# Patient Record
Sex: MALE | URBAN - METROPOLITAN AREA
[De-identification: names, ages, dates, MRNs, and addresses within clinical notes are randomized per-mention and may not be internally consistent; named-entity substitution may affect disease eponyms.]

---

## 2022-06-10 ENCOUNTER — TELEPHONE (OUTPATIENT)
Dept: UROLOGY | Facility: CLINIC | Age: 67
End: 2022-06-10

## 2022-06-10 ENCOUNTER — TRANSCRIBE ORDERS (OUTPATIENT)
Dept: OTHER | Age: 67
End: 2022-06-10

## 2022-06-10 DIAGNOSIS — N28.89 URETERIC FISTULA: Primary | ICD-10-CM

## 2022-06-10 NOTE — TELEPHONE ENCOUNTER
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

## 2022-06-14 NOTE — TELEPHONE ENCOUNTER
Spoke with patient and he will call back to schedule with Morgan or Michele for ureterocolonic fistula

## 2023-08-07 ENCOUNTER — TRANSITIONAL CARE UNIT VISIT (OUTPATIENT)
Dept: GERIATRICS | Facility: CLINIC | Age: 68
End: 2023-08-07
Payer: COMMERCIAL

## 2023-08-07 VITALS
RESPIRATION RATE: 20 BRPM | SYSTOLIC BLOOD PRESSURE: 113 MMHG | DIASTOLIC BLOOD PRESSURE: 67 MMHG | TEMPERATURE: 98.2 F | WEIGHT: 133.1 LBS | OXYGEN SATURATION: 96 % | BODY MASS INDEX: 19.71 KG/M2 | HEIGHT: 69 IN | HEART RATE: 69 BPM

## 2023-08-07 DIAGNOSIS — R53.81 PHYSICAL DECONDITIONING: Primary | ICD-10-CM

## 2023-08-07 DIAGNOSIS — R39.0: ICD-10-CM

## 2023-08-07 DIAGNOSIS — I10 BENIGN ESSENTIAL HYPERTENSION: ICD-10-CM

## 2023-08-07 DIAGNOSIS — R52 PAIN: Primary | ICD-10-CM

## 2023-08-07 DIAGNOSIS — N18.31 STAGE 3A CHRONIC KIDNEY DISEASE (H): ICD-10-CM

## 2023-08-07 DIAGNOSIS — I50.32 CHRONIC HEART FAILURE WITH PRESERVED EJECTION FRACTION (H): ICD-10-CM

## 2023-08-07 DIAGNOSIS — Z48.815 ENCOUNTER FOR SURGICAL AFTERCARE FOLLOWING SURGERY OF DIGESTIVE SYSTEM: ICD-10-CM

## 2023-08-07 DIAGNOSIS — I47.19 ATRIAL TACHYCARDIA (H): ICD-10-CM

## 2023-08-07 PROCEDURE — 99310 SBSQ NF CARE HIGH MDM 45: CPT | Performed by: NURSE PRACTITIONER

## 2023-08-07 RX ORDER — PREDNISONE 10 MG/1
30 TABLET ORAL DAILY
COMMUNITY
Start: 2023-08-05 | End: 2023-08-13

## 2023-08-07 RX ORDER — LEVOTHYROXINE SODIUM 88 UG/1
88 TABLET ORAL DAILY
COMMUNITY
Start: 2023-04-30

## 2023-08-07 RX ORDER — MULTIVITAMIN
1 TABLET ORAL DAILY
COMMUNITY

## 2023-08-07 RX ORDER — ALBUTEROL SULFATE 0.83 MG/ML
2.5 SOLUTION RESPIRATORY (INHALATION) EVERY 4 HOURS PRN
COMMUNITY
Start: 2023-08-04

## 2023-08-07 RX ORDER — ATORVASTATIN CALCIUM 20 MG/1
1 TABLET, FILM COATED ORAL DAILY
COMMUNITY
Start: 2023-05-02

## 2023-08-07 RX ORDER — TRAZODONE HYDROCHLORIDE 100 MG/1
1 TABLET ORAL AT BEDTIME
COMMUNITY

## 2023-08-07 RX ORDER — SPIRONOLACTONE 25 MG/1
1 TABLET ORAL EVERY MORNING
COMMUNITY
Start: 2023-08-05 | End: 2023-08-23

## 2023-08-07 RX ORDER — ACETAMINOPHEN 325 MG/1
650 TABLET ORAL EVERY 4 HOURS PRN
COMMUNITY
Start: 2022-07-12

## 2023-08-07 RX ORDER — METOPROLOL SUCCINATE 50 MG/1
50 TABLET, EXTENDED RELEASE ORAL DAILY
COMMUNITY
Start: 2023-05-01

## 2023-08-07 RX ORDER — FUROSEMIDE 40 MG
1 TABLET ORAL EVERY MORNING
COMMUNITY
Start: 2023-08-05 | End: 2023-09-01

## 2023-08-07 RX ORDER — OXYBUTYNIN CHLORIDE 10 MG/1
1 TABLET, EXTENDED RELEASE ORAL DAILY
COMMUNITY
Start: 2023-06-14 | End: 2023-08-23

## 2023-08-07 RX ORDER — FERROUS SULFATE 325(65) MG
325 TABLET ORAL DAILY
COMMUNITY
Start: 2023-08-05

## 2023-08-07 RX ORDER — OXYCODONE HYDROCHLORIDE 5 MG/1
5 TABLET ORAL EVERY 6 HOURS PRN
Qty: 60 TABLET | Refills: 0 | Status: SHIPPED | OUTPATIENT
Start: 2023-08-07 | End: 2023-08-10

## 2023-08-07 NOTE — LETTER
8/7/2023        RE: Houston Willis  [address] [place] [address]        [facility]    PRIMARY CARE PROVIDER AND CLINIC:  Parag Mann MD  Chief Complaint   Patient presents with     Hospital F/U      [place] Medical Record Number:  [id number]  Place of Service where encounter took place:  [facility] ([facility]) [[id number]]    Houston Willis  is a 68 year old  (1955), admitted to the above facility from  [facility]. Hospital stay 7/19/23 through 8/5/23. 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 Post-op course complicated by respiratory failure, fluid overload, abdominal wall wound dehiscence requiring takeback to OR, and a left ureteral stump urine leak being managed by Mcknight catheter.     HPI obtained from patient visit, review of nursing home record, discussion with facility staff, and Epic review.     HPI:    Patient just returned from doing therapy. He says this went well. As we are talking he starts opening his colostomy (laying in bed, no gloves on), provider asks why he is doing then, he says to expel the gas. He ends up getting stool on his fingers. He recognizes that this is not ideal, but just wipes them with a tissue. He also says that he believes that the ostomy should remain free of stool, so he has had nursing flush the bag with saline. Provider advises him that this is not necessary. For now he should use his call light for any colostomy needs and nursing will be teaching him how to manage it properly. He asks about having the Mcknight catheter removed, says he can just use a urinal. Provider educates him about the reason for the catheter.     CODE STATUS/ADVANCE DIRECTIVES DISCUSSION:  Full Code    ALLERGIES:   Allergies   Allergen Reactions     Amiodarone Other (See  Comments)     Bradycardia, possible lung toxicity(work up pending)      PAST MEDICAL HISTORY: No past medical history on file.   PAST SURGICAL HISTORY:   has no past surgical history on file.  FAMILY HISTORY: family history is not on file.  SOCIAL HISTORY:     Patient's living condition: lives alone    Post Discharge Medication Reconciliation Status:   MED REC REQUIRED  Post Medication Reconciliation Status:  Discharge medications reconciled, continue medications without change       Current Outpatient Medications   Medication Sig     acetaminophen (TYLENOL) 325 MG tablet Take 650 mg by mouth every 4 hours as needed     albuterol (PROVENTIL) (2.5 MG/3ML) 0.083% neb solution Take 2.5 mg by nebulization every 4 hours as needed     apixaban ANTICOAGULANT (ELIQUIS ANTICOAGULANT) 5 MG tablet Take 1 tablet by mouth 2 times daily     atorvastatin (LIPITOR) 20 MG tablet Take 1 tablet by mouth daily     ferrous sulfate (FEROSUL) 325 (65 Fe) MG tablet Take 325 mg by mouth daily     furosemide (LASIX) 40 MG tablet Take 1 tablet by mouth every morning     levothyroxine (SYNTHROID/LEVOTHROID) 88 MCG tablet Take 88 mcg by mouth daily     metoprolol succinate ER (TOPROL XL) 50 MG 24 hr tablet Take 50 mg by mouth daily     Multiple Vitamin (ONE-A-DAY ESSENTIAL) TABS Take 1 tablet by mouth daily     omeprazole (PRILOSEC) 20 MG DR capsule Take 20 mg by mouth daily     oxybutynin ER (DITROPAN XL) 10 MG 24 hr tablet Take 1 tablet by mouth daily     oxyCODONE (ROXICODONE) 5 MG tablet Take 1 tablet (5 mg) by mouth every 6 hours as needed for pain     predniSONE (DELTASONE) 10 MG tablet Take 30 mg by mouth daily     spironolactone (ALDACTONE) 25 MG tablet Take 1 tablet by mouth every morning     traZODone (DESYREL) 100 MG tablet Take 1 tablet by mouth At Bedtime     No current facility-administered medications for this visit.       ROS:  10 point ROS of systems including Constitutional, Eyes, Respiratory, Cardiovascular, Gastroenterology,  "Genitourinary, Integumentary, Musculoskeletal, Psychiatric were all negative except for pertinent positives noted in my HPI.    Vitals:  /67   Pulse 69   Temp 98.2  F (36.8  C)   Resp 20   Ht 1.753 m (5' 9\")   Wt 60.4 kg (133 lb 1.6 oz)   SpO2 96%   BMI 19.66 kg/m    Exam:  GENERAL APPEARANCE:  Alert, in no distress  ENT:  Mouth and posterior oropharynx normal, moist mucous membranes  EYES:  EOM normal, conjunctiva and lids normal  RESP:  no respiratory distress  CV:  no edema  ABDOMEN:  soft, no distension, colostomy round, red, moist, stool is brown and soft, incision healing well with staples  PSYCH:  oriented X 3, insight and judgement impaired    Lab/Diagnostic data:  Recent labs in [facility] reviewed by me today.       ASSESSMENT/PLAN:  (R53.81) Physical deconditioning  (primary encounter diagnosis)  Comment: Due to surgery and post-op complications  Plan: PT/OT eval and treat, discharge planning per their recommendations.    (Z48.815) Encounter for surgical aftercare following surgery of digestive system  Comment: Wound healing well. Ostomy functioning appropriately. Patient requires teaching, but this can be accomplished while at TCU.   Plan: Continue current POC with no changes at this time and adjustments as needed.    (R39.0) Ureteral leak, left  Comment: Patient could potentially go home with catheter as well. Will focus on ostomy teaching for now. If discharge is planned and catheter remains in place, recommend teaching at that time  Plan: Continue current POC with no changes at this time and adjustments as needed.    (I47.1) Atrial tachycardia (H)  Comment: Rate controlled. On DOAC for anticoagulation with no s/sx side effects  Plan: Continue current POC with no changes at this time and adjustments as needed.    (I50.32) Chronic heart failure with preserved ejection fraction (H)  Comment: Currently compensated. He is on double his home dose of lasix and aldactone is new. Will need to monitor " BMP and adjust as needed.  Plan: Continue current medications: lasix and aldactone. Monitor labs weekly. Monitor weights q day. Call provider if greater than 3 pound gain from previous weight. Monitor for shortness of breath, wheezing, increasing lower extremity edema and change in activity tolerance.   Monitor for shortness of breath, wheezing, increasing lower extremity edema and change in activity tolerance.     (N18.31) Stage 3a chronic kidney disease (H)  Comment: Chronic Kidney Disease due to: Hypertension.  Plan: Avoid nephrotoxic medications and adjust medications per renal function.     (I10) Benign essential hypertension  Comment: Chronic, controlled  Plan: Continue current POC with no changes at this time and adjustments as needed.        Total time spent with patient visit at the skilled nursing facility was 45 min including patient visit and review of past records.     Electronically signed by:  CODY Constantino CNP                     Sincerely,        CODY Constantino CNP

## 2023-08-08 PROBLEM — N18.30 STAGE 3 CHRONIC KIDNEY DISEASE (H): Status: ACTIVE | Noted: 2022-03-30

## 2023-08-08 PROBLEM — I50.32 CHRONIC HEART FAILURE WITH PRESERVED EJECTION FRACTION (H): Status: ACTIVE | Noted: 2023-08-08

## 2023-08-08 PROBLEM — I47.19 ATRIAL TACHYCARDIA (H): Status: ACTIVE | Noted: 2023-07-04

## 2023-08-08 PROBLEM — N32.1 VESICOCOLIC FISTULA: Status: ACTIVE | Noted: 2022-01-18

## 2023-08-08 PROBLEM — I10 BENIGN ESSENTIAL HYPERTENSION: Status: ACTIVE | Noted: 2023-08-08

## 2023-08-08 NOTE — PROGRESS NOTES
[facility]    PRIMARY CARE PROVIDER AND CLINIC:  Parag Mann MD  Chief Complaint   Patient presents with    Hospital F/U      [place] Medical Record Number:  [id number]  Place of Service where encounter took place:  [facility] ([facility]) [[id number]]    Houston Willis  is a 68 year old  (1955), admitted to the above facility from  [facility]. Hospital stay 7/19/23 through 8/5/23. 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 Post-op course complicated by respiratory failure, fluid overload, abdominal wall wound dehiscence requiring takeback to OR, and a left ureteral stump urine leak being managed by Mcknight catheter.     HPI obtained from patient visit, review of nursing home record, discussion with facility staff, and Epic review.     HPI:    Patient just returned from doing therapy. He says this went well. As we are talking he starts opening his colostomy (laying in bed, no gloves on), provider asks why he is doing then, he says to expel the gas. He ends up getting stool on his fingers. He recognizes that this is not ideal, but just wipes them with a tissue. He also says that he believes that the ostomy should remain free of stool, so he has had nursing flush the bag with saline. Provider advises him that this is not necessary. For now he should use his call light for any colostomy needs and nursing will be teaching him how to manage it properly. He asks about having the Mcknight catheter removed, says he can just use a urinal. Provider educates him about the reason for the catheter.     CODE STATUS/ADVANCE DIRECTIVES DISCUSSION:  Full Code    ALLERGIES:   Allergies   Allergen Reactions    Amiodarone Other (See Comments)     Bradycardia, possible lung toxicity(work up pending)      PAST MEDICAL  HISTORY: No past medical history on file.   PAST SURGICAL HISTORY:   has no past surgical history on file.  FAMILY HISTORY: family history is not on file.  SOCIAL HISTORY:     Patient's living condition: lives alone    Post Discharge Medication Reconciliation Status:   MED REC REQUIRED  Post Medication Reconciliation Status:  Discharge medications reconciled, continue medications without change       Current Outpatient Medications   Medication Sig    acetaminophen (TYLENOL) 325 MG tablet Take 650 mg by mouth every 4 hours as needed    albuterol (PROVENTIL) (2.5 MG/3ML) 0.083% neb solution Take 2.5 mg by nebulization every 4 hours as needed    apixaban ANTICOAGULANT (ELIQUIS ANTICOAGULANT) 5 MG tablet Take 1 tablet by mouth 2 times daily    atorvastatin (LIPITOR) 20 MG tablet Take 1 tablet by mouth daily    ferrous sulfate (FEROSUL) 325 (65 Fe) MG tablet Take 325 mg by mouth daily    furosemide (LASIX) 40 MG tablet Take 1 tablet by mouth every morning    levothyroxine (SYNTHROID/LEVOTHROID) 88 MCG tablet Take 88 mcg by mouth daily    metoprolol succinate ER (TOPROL XL) 50 MG 24 hr tablet Take 50 mg by mouth daily    Multiple Vitamin (ONE-A-DAY ESSENTIAL) TABS Take 1 tablet by mouth daily    omeprazole (PRILOSEC) 20 MG DR capsule Take 20 mg by mouth daily    oxybutynin ER (DITROPAN XL) 10 MG 24 hr tablet Take 1 tablet by mouth daily    oxyCODONE (ROXICODONE) 5 MG tablet Take 1 tablet (5 mg) by mouth every 6 hours as needed for pain    predniSONE (DELTASONE) 10 MG tablet Take 30 mg by mouth daily    spironolactone (ALDACTONE) 25 MG tablet Take 1 tablet by mouth every morning    traZODone (DESYREL) 100 MG tablet Take 1 tablet by mouth At Bedtime     No current facility-administered medications for this visit.       ROS:  10 point ROS of systems including Constitutional, Eyes, Respiratory, Cardiovascular, Gastroenterology, Genitourinary, Integumentary, Musculoskeletal, Psychiatric were all negative except for pertinent  "positives noted in my HPI.    Vitals:  /67   Pulse 69   Temp 98.2  F (36.8  C)   Resp 20   Ht 1.753 m (5' 9\")   Wt 60.4 kg (133 lb 1.6 oz)   SpO2 96%   BMI 19.66 kg/m    Exam:  GENERAL APPEARANCE:  Alert, in no distress  ENT:  Mouth and posterior oropharynx normal, moist mucous membranes  EYES:  EOM normal, conjunctiva and lids normal  RESP:  no respiratory distress  CV:  no edema  ABDOMEN:  soft, no distension, colostomy round, red, moist, stool is brown and soft, incision healing well with staples  PSYCH:  oriented X 3, insight and judgement impaired    Lab/Diagnostic data:  Recent labs in [facility] reviewed by me today.       ASSESSMENT/PLAN:  (R53.81) Physical deconditioning  (primary encounter diagnosis)  Comment: Due to surgery and post-op complications  Plan: PT/OT eval and treat, discharge planning per their recommendations.    (Z48.815) Encounter for surgical aftercare following surgery of digestive system  Comment: Wound healing well. Ostomy functioning appropriately. Patient requires teaching, but this can be accomplished while at TCU.   Plan: Continue current POC with no changes at this time and adjustments as needed.    (R39.0) Ureteral leak, left  Comment: Patient could potentially go home with catheter as well. Will focus on ostomy teaching for now. If discharge is planned and catheter remains in place, recommend teaching at that time  Plan: Continue current POC with no changes at this time and adjustments as needed.    (I47.1) Atrial tachycardia (H)  Comment: Rate controlled. On DOAC for anticoagulation with no s/sx side effects  Plan: Continue current POC with no changes at this time and adjustments as needed.    (I50.32) Chronic heart failure with preserved ejection fraction (H)  Comment: Currently compensated. He is on double his home dose of lasix and aldactone is new. Will need to monitor BMP and adjust as needed.  Plan: Continue current medications: lasix and aldactone. Monitor labs " weekly. Monitor weights q day. Call provider if greater than 3 pound gain from previous weight. Monitor for shortness of breath, wheezing, increasing lower extremity edema and change in activity tolerance.   Monitor for shortness of breath, wheezing, increasing lower extremity edema and change in activity tolerance.     (N18.31) Stage 3a chronic kidney disease (H)  Comment: Chronic Kidney Disease due to: Hypertension.  Plan: Avoid nephrotoxic medications and adjust medications per renal function.     (I10) Benign essential hypertension  Comment: Chronic, controlled  Plan: Continue current POC with no changes at this time and adjustments as needed.        Total time spent with patient visit at the skilled nursing facility was 45 min including patient visit and review of past records.     Electronically signed by:  CODY Constantino CNP

## 2023-08-09 ENCOUNTER — TRANSITIONAL CARE UNIT VISIT (OUTPATIENT)
Dept: GERIATRICS | Facility: CLINIC | Age: 68
End: 2023-08-09
Payer: COMMERCIAL

## 2023-08-09 VITALS
TEMPERATURE: 97 F | WEIGHT: 124.6 LBS | SYSTOLIC BLOOD PRESSURE: 130 MMHG | HEART RATE: 77 BPM | OXYGEN SATURATION: 93 % | BODY MASS INDEX: 18.46 KG/M2 | HEIGHT: 69 IN | DIASTOLIC BLOOD PRESSURE: 70 MMHG | RESPIRATION RATE: 16 BRPM

## 2023-08-09 DIAGNOSIS — N18.31 STAGE 3A CHRONIC KIDNEY DISEASE (H): ICD-10-CM

## 2023-08-09 DIAGNOSIS — R53.81 PHYSICAL DECONDITIONING: Primary | ICD-10-CM

## 2023-08-09 DIAGNOSIS — I47.19 ATRIAL TACHYCARDIA (H): ICD-10-CM

## 2023-08-09 DIAGNOSIS — I10 BENIGN ESSENTIAL HYPERTENSION: ICD-10-CM

## 2023-08-09 DIAGNOSIS — Z48.815 ENCOUNTER FOR SURGICAL AFTERCARE FOLLOWING SURGERY OF DIGESTIVE SYSTEM: ICD-10-CM

## 2023-08-09 DIAGNOSIS — R39.0: ICD-10-CM

## 2023-08-09 DIAGNOSIS — I50.32 CHRONIC HEART FAILURE WITH PRESERVED EJECTION FRACTION (H): ICD-10-CM

## 2023-08-09 PROCEDURE — 99309 SBSQ NF CARE MODERATE MDM 30: CPT | Performed by: NURSE PRACTITIONER

## 2023-08-09 NOTE — PROGRESS NOTES
"[facility]ERIATR[facility]    Chief Complaint   Patient presents with    RECHECK     HPI:  Houston Willis is a 68 year old  (1955), who is being seen today for an episodic care visit at: [facility] ([facility]) [[id number]]. [facility] stay 7/19/23 through 8/5/23. 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 Post-op course complicated by respiratory failure, fluid overload, abdominal wall wound dehiscence requiring takeback to OR, and a left ureteral stump urine leak being managed by Mcknight catheter.     Today's concern is:   Patient reports ongoing improvement with therapy. He understands that he needs to keep the catheter in until he sees urology, but he is under the impression that they will be calling any day to give the order. Provider advises him that he will need to go in. He says he does not want the oxycodone at all. He has not had any alcohol for over 2 months and hopes to avoid anything addictive. Staff have noted that he can be impulsive. SLUMS 18/30. He mod assist for transfers, but walking 200 feet.   -120s/60-70s  HR 60-80s    Allergies, and PMH/PSH reviewed in EPIC today.  REVIEW OF SYSTEMS:  10 point ROS of systems including Constitutional, Eyes, Respiratory, Cardiovascular, Gastroenterology, Genitourinary, Integumentary, Musculoskeletal, Psychiatric were all negative except for pertinent positives noted in my HPI.    Objective:   /70   Pulse 77   Temp 97  F (36.1  C)   Resp 16   Ht 1.753 m (5' 9\")   Wt 56.5 kg (124 lb 9.6 oz)   SpO2 93%   BMI 18.40 kg/m    GENERAL APPEARANCE:  Alert, in no distress  ENT:  Mouth and posterior oropharynx normal, moist mucous membranes  EYES:  EOM normal, conjunctiva and lids normal  RESP:  no respiratory distress  CV:  no edema  ABDOMEN:  no distension, ostomy " red, moist, round  :    catheter in place, urine is clear, nash  PSYCH:  oriented X 3, insight and judgement impaired, affect and mood normal    Recent labs in EPIC reviewed by me today.     Assessment/Plan:  (R53.81) Physical deconditioning  (primary encounter diagnosis)  Comment: Improving  Plan: PT/OT eval and treat, discharge planning per their recommendations.    (Z48.815) Encounter for surgical aftercare following surgery of digestive system  Comment: Patient will need quite a bit of education regarding ostomy cares. He likely will need support at home as well. It is not clear yet how that will be accomplished. Much is dependent on his ability to learn the cares. His abdomen is healing well, ostomy functioning appropriately  Plan: Continue current POC with no changes at this time and adjustments as needed.    (R39.0) Ureteral leak, left  Comment: Patient could potentially go home with catheter as well. Will focus on ostomy teaching for now. If discharge is planned and catheter remains in place, recommend teaching at that time  Plan: Continue current POC with no changes at this time and adjustments as needed.    (I47.1) Atrial tachycardia (H)  Comment: Rate controlled. On DOAC for anticoagulation with no s/sx side effects  Plan: Continue current POC with no changes at this time and adjustments as needed.    (I50.32) Chronic heart failure with preserved ejection fraction (H)  Comment: Currently compensated. He is on double his home dose of lasix and aldactone is new. Will need to monitor BMP and adjust as needed.  Plan: Continue current medications: lasix and aldactone. Monitor labs weekly. Monitor weights q day. Call provider if greater than 3 pound gain from previous weight. Monitor for shortness of breath, wheezing, increasing lower extremity edema and change in activity tolerance.     (N18.31) Stage 3a chronic kidney disease (H)  Comment: Chronic Kidney Disease due to: Hypertension.  Plan: Avoid  nephrotoxic medications and adjust medications per renal function.     (I10) Benign essential hypertension  Comment: Chronic, controlled  Plan: Continue current POC with no changes at this time and adjustments as needed.      Orders:  Discontinue oxycodone  BMP 8/14/23    Electronically signed by: CODY Constantino CNP

## 2023-08-09 NOTE — LETTER
"    8/9/2023        RE: Houston Willis  [address] [place] [address]        [facility] GERIATR[facility]    Chief Complaint   Patient presents with     RECHECK     HPI:  Houston Willis is a 68 year old  (1955), who is being seen today for an episodic care visit at: [facility] ([facility]) [[id number]]. [facility] stay 7/19/23 through 8/5/23. 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 Post-op course complicated by respiratory failure, fluid overload, abdominal wall wound dehiscence requiring takeback to OR, and a left ureteral stump urine leak being managed by Mcknight catheter.     Today's concern is:   Patient reports ongoing improvement with therapy. He understands that he needs to keep the catheter in until he sees urology, but he is under the impression that they will be calling any day to give the order. Provider advises him that he will need to go in. He says he does not want the oxycodone at all. He has not had any alcohol for over 2 months and hopes to avoid anything addictive. Staff have noted that he can be impulsive. SLUMS 18/30. He mod assist for transfers, but walking 200 feet.   -120s/60-70s  HR 60-80s    Allergies, and PMH/PSH reviewed in EPIC today.  REVIEW OF SYSTEMS:  10 point ROS of systems including Constitutional, Eyes, Respiratory, Cardiovascular, Gastroenterology, Genitourinary, Integumentary, Musculoskeletal, Psychiatric were all negative except for pertinent positives noted in my HPI.    Objective:   /70   Pulse 77   Temp 97  F (36.1  C)   Resp 16   Ht 1.753 m (5' 9\")   Wt 56.5 kg (124 lb 9.6 oz)   SpO2 93%   BMI 18.40 kg/m    GENERAL APPEARANCE:  Alert, in no distress  ENT:  Mouth and posterior oropharynx normal, moist mucous membranes  EYES:  EOM normal, conjunctiva and " lids normal  RESP:  no respiratory distress  CV:  no edema  ABDOMEN:  no distension, ostomy red, moist, round  :    catheter in place, urine is clear, nash  PSYCH:  oriented X 3, insight and judgement impaired, affect and mood normal    Recent labs in [facility] reviewed by me today.     Assessment/Plan:  (R53.81) Physical deconditioning  (primary encounter diagnosis)  Comment: Improving  Plan: PT/OT eval and treat, discharge planning per their recommendations.    (Z48.815) Encounter for surgical aftercare following surgery of digestive system  Comment: Patient will need quite a bit of education regarding ostomy cares. He likely will need support at home as well. It is not clear yet how that will be accomplished. Much is dependent on his ability to learn the cares. His abdomen is healing well, ostomy functioning appropriately  Plan: Continue current POC with no changes at this time and adjustments as needed.    (R39.0) Ureteral leak, left  Comment: Patient could potentially go home with catheter as well. Will focus on ostomy teaching for now. If discharge is planned and catheter remains in place, recommend teaching at that time  Plan: Continue current POC with no changes at this time and adjustments as needed.    (I47.1) Atrial tachycardia (H)  Comment: Rate controlled. On DOAC for anticoagulation with no s/sx side effects  Plan: Continue current POC with no changes at this time and adjustments as needed.    (I50.32) Chronic heart failure with preserved ejection fraction (H)  Comment: Currently compensated. He is on double his home dose of lasix and aldactone is new. Will need to monitor BMP and adjust as needed.  Plan: Continue current medications: lasix and aldactone. Monitor labs weekly. Monitor weights q day. Call provider if greater than 3 pound gain from previous weight. Monitor for shortness of breath, wheezing, increasing lower extremity edema and change in activity tolerance.     (N18.31) Stage 3a chronic  kidney disease (H)  Comment: Chronic Kidney Disease due to: Hypertension.  Plan: Avoid nephrotoxic medications and adjust medications per renal function.     (I10) Benign essential hypertension  Comment: Chronic, controlled  Plan: Continue current POC with no changes at this time and adjustments as needed.      Orders:  Discontinue oxycodone  BMP 8/14/23    Electronically signed by: CODY Constantino CNP         Sincerely,        CODY Constantino CNP

## 2023-08-12 ENCOUNTER — LAB REQUISITION (OUTPATIENT)
Dept: LAB | Facility: CLINIC | Age: 68
End: 2023-08-12
Payer: MEDICARE

## 2023-08-12 DIAGNOSIS — I10 ESSENTIAL (PRIMARY) HYPERTENSION: ICD-10-CM

## 2023-08-22 ENCOUNTER — LAB REQUISITION (OUTPATIENT)
Dept: LAB | Facility: CLINIC | Age: 68
End: 2023-08-22
Payer: COMMERCIAL

## 2023-08-22 ENCOUNTER — TRANSFERRED RECORDS (OUTPATIENT)
Dept: HEALTH INFORMATION MANAGEMENT | Facility: CLINIC | Age: 68
End: 2023-08-22

## 2023-08-22 DIAGNOSIS — L08.89 OTHER SPECIFIED LOCAL INFECTIONS OF THE SKIN AND SUBCUTANEOUS TISSUE: ICD-10-CM

## 2023-08-23 ENCOUNTER — TRANSITIONAL CARE UNIT VISIT (OUTPATIENT)
Dept: GERIATRICS | Facility: CLINIC | Age: 68
End: 2023-08-23
Payer: COMMERCIAL

## 2023-08-23 ENCOUNTER — TELEPHONE (OUTPATIENT)
Dept: GERIATRICS | Facility: CLINIC | Age: 68
End: 2023-08-23

## 2023-08-23 ENCOUNTER — LAB REQUISITION (OUTPATIENT)
Dept: LAB | Facility: CLINIC | Age: 68
End: 2023-08-23
Payer: COMMERCIAL

## 2023-08-23 VITALS
SYSTOLIC BLOOD PRESSURE: 101 MMHG | WEIGHT: 125.3 LBS | HEART RATE: 60 BPM | TEMPERATURE: 97.7 F | OXYGEN SATURATION: 98 % | DIASTOLIC BLOOD PRESSURE: 62 MMHG | BODY MASS INDEX: 18.56 KG/M2 | HEIGHT: 69 IN | RESPIRATION RATE: 17 BRPM

## 2023-08-23 DIAGNOSIS — N30.01 ACUTE CYSTITIS WITH HEMATURIA: ICD-10-CM

## 2023-08-23 DIAGNOSIS — I47.19 ATRIAL TACHYCARDIA (H): ICD-10-CM

## 2023-08-23 DIAGNOSIS — N18.30 CHRONIC KIDNEY DISEASE, STAGE 3 UNSPECIFIED (H): ICD-10-CM

## 2023-08-23 DIAGNOSIS — I10 ESSENTIAL (PRIMARY) HYPERTENSION: ICD-10-CM

## 2023-08-23 DIAGNOSIS — I50.32 CHRONIC HEART FAILURE WITH PRESERVED EJECTION FRACTION (H): ICD-10-CM

## 2023-08-23 DIAGNOSIS — N18.31 STAGE 3A CHRONIC KIDNEY DISEASE (H): ICD-10-CM

## 2023-08-23 DIAGNOSIS — N32.1 VESICOCOLIC FISTULA: ICD-10-CM

## 2023-08-23 DIAGNOSIS — R53.81 PHYSICAL DECONDITIONING: Primary | ICD-10-CM

## 2023-08-23 DIAGNOSIS — I10 BENIGN ESSENTIAL HYPERTENSION: ICD-10-CM

## 2023-08-23 PROBLEM — F10.20 ALCOHOL DEPENDENCE (H): Status: ACTIVE | Noted: 2022-03-30

## 2023-08-23 PROBLEM — R97.20 HIGH PROSTATE SPECIFIC ANTIGEN (PSA): Status: ACTIVE | Noted: 2017-11-02

## 2023-08-23 PROBLEM — N39.3 STRESS INCONTINENCE, MALE: Status: ACTIVE | Noted: 2022-11-09

## 2023-08-23 PROBLEM — R31.29 MICROSCOPIC HEMATURIA: Status: ACTIVE | Noted: 2021-12-29

## 2023-08-23 PROBLEM — R31.0 GROSS HEMATURIA: Status: ACTIVE | Noted: 2022-08-20

## 2023-08-23 PROBLEM — R39.89 PNEUMATURIA: Status: ACTIVE | Noted: 2021-12-29

## 2023-08-23 PROBLEM — D64.9 ANEMIA: Status: ACTIVE | Noted: 2022-08-06

## 2023-08-23 PROBLEM — D62 ACUTE POSTHEMORRHAGIC ANEMIA: Status: ACTIVE | Noted: 2022-09-04

## 2023-08-23 PROBLEM — N30.40 RADIATION CYSTITIS: Status: ACTIVE | Noted: 2022-08-29

## 2023-08-23 PROBLEM — N17.9 ACUTE KIDNEY INJURY (H): Status: ACTIVE | Noted: 2023-04-20

## 2023-08-23 PROBLEM — C61 MALIGNANT NEOPLASM OF PROSTATE (H): Status: ACTIVE | Noted: 2018-01-16

## 2023-08-23 LAB
ALBUMIN SERPL BCG-MCNC: 2.4 G/DL (ref 3.5–5.2)
ALP SERPL-CCNC: 112 U/L (ref 40–129)
ALT SERPL W P-5'-P-CCNC: 39 U/L (ref 0–70)
ANION GAP SERPL CALCULATED.3IONS-SCNC: 9 MMOL/L (ref 7–15)
AST SERPL W P-5'-P-CCNC: 20 U/L (ref 0–45)
BASOPHILS # BLD AUTO: 0 10E3/UL (ref 0–0.2)
BASOPHILS NFR BLD AUTO: 0 %
BILIRUB SERPL-MCNC: 0.2 MG/DL
BUN SERPL-MCNC: 20.6 MG/DL (ref 8–23)
CALCIUM SERPL-MCNC: 8.4 MG/DL (ref 8.8–10.2)
CHLORIDE SERPL-SCNC: 101 MMOL/L (ref 98–107)
CK SERPL-CCNC: 14 U/L (ref 39–308)
CREAT SERPL-MCNC: 1.04 MG/DL (ref 0.67–1.17)
DEPRECATED HCO3 PLAS-SCNC: 28 MMOL/L (ref 22–29)
EOSINOPHIL # BLD AUTO: 0.6 10E3/UL (ref 0–0.7)
EOSINOPHIL NFR BLD AUTO: 5 %
ERYTHROCYTE [DISTWIDTH] IN BLOOD BY AUTOMATED COUNT: 19.9 % (ref 10–15)
GFR SERPL CREATININE-BSD FRML MDRD: 78 ML/MIN/1.73M2
GLUCOSE SERPL-MCNC: 75 MG/DL (ref 70–99)
HCT VFR BLD AUTO: 29.4 % (ref 40–53)
HGB BLD-MCNC: 9 G/DL (ref 13.3–17.7)
IMM GRANULOCYTES # BLD: 0.2 10E3/UL
IMM GRANULOCYTES NFR BLD: 2 %
LYMPHOCYTES # BLD AUTO: 1.1 10E3/UL (ref 0.8–5.3)
LYMPHOCYTES NFR BLD AUTO: 10 %
MCH RBC QN AUTO: 28.8 PG (ref 26.5–33)
MCHC RBC AUTO-ENTMCNC: 30.6 G/DL (ref 31.5–36.5)
MCV RBC AUTO: 94 FL (ref 78–100)
MONOCYTES # BLD AUTO: 0.5 10E3/UL (ref 0–1.3)
MONOCYTES NFR BLD AUTO: 5 %
NEUTROPHILS # BLD AUTO: 8.4 10E3/UL (ref 1.6–8.3)
NEUTROPHILS NFR BLD AUTO: 78 %
NRBC # BLD AUTO: 0 10E3/UL
NRBC BLD AUTO-RTO: 0 /100
PLATELET # BLD AUTO: 298 10E3/UL (ref 150–450)
POTASSIUM SERPL-SCNC: 3.8 MMOL/L (ref 3.4–5.3)
PROT SERPL-MCNC: 5 G/DL (ref 6.4–8.3)
RBC # BLD AUTO: 3.12 10E6/UL (ref 4.4–5.9)
SODIUM SERPL-SCNC: 138 MMOL/L (ref 136–145)
WBC # BLD AUTO: 10.9 10E3/UL (ref 4–11)

## 2023-08-23 PROCEDURE — 82550 ASSAY OF CK (CPK): CPT | Mod: ORL | Performed by: INTERNAL MEDICINE

## 2023-08-23 PROCEDURE — P9603 ONE-WAY ALLOW PRORATED MILES: HCPCS | Mod: ORL | Performed by: INTERNAL MEDICINE

## 2023-08-23 PROCEDURE — 80053 COMPREHEN METABOLIC PANEL: CPT | Mod: ORL | Performed by: INTERNAL MEDICINE

## 2023-08-23 PROCEDURE — 99309 SBSQ NF CARE MODERATE MDM 30: CPT | Performed by: NURSE PRACTITIONER

## 2023-08-23 PROCEDURE — 36415 COLL VENOUS BLD VENIPUNCTURE: CPT | Mod: ORL | Performed by: INTERNAL MEDICINE

## 2023-08-23 PROCEDURE — 85025 COMPLETE CBC W/AUTO DIFF WBC: CPT | Mod: ORL | Performed by: INTERNAL MEDICINE

## 2023-08-23 RX ORDER — HYDROMORPHONE HYDROCHLORIDE 2 MG/1
2 TABLET ORAL EVERY 4 HOURS PRN
COMMUNITY
Start: 2023-08-23 | End: 2023-08-25

## 2023-08-23 RX ORDER — ERTAPENEM 1 G/1
1 INJECTION, POWDER, LYOPHILIZED, FOR SOLUTION INTRAMUSCULAR; INTRAVENOUS EVERY 24 HOURS
COMMUNITY
Start: 2023-08-22 | End: 2023-08-30

## 2023-08-23 RX ORDER — HYDROXYZINE PAMOATE 25 MG/1
25 CAPSULE ORAL 4 TIMES DAILY PRN
COMMUNITY
Start: 2023-08-23 | End: 2023-09-06

## 2023-08-23 NOTE — PROGRESS NOTES
[facility] GERIATRICS  Primary Care Provider & Clinic: Parag Mann MD, [address] [address] / [place] [address]  Chief Complaint   Patient presents with    Hospital F/U     [place] 8/14/2023 - 8/22/2023     [place] Medical Record Number: [id number]  Place of Service Where Encounter Took Place: [facility] (TCU) [[id number]]    Aron Walker is a 68 year old (1955), returned to the above facility from  [facility]. Hospital stay 8/14/23 - 8/22/23. Patient was admitted to this TCU originally on 8/5/23 from [facility]. 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 Post-op course complicated by respiratory failure, fluid overload, abdominal wall wound dehiscence requiring takeback to OR, and a left ureteral stump urine leak being managed by Mcknight catheter.   He was sent back to the ED due to purulent discharge from abdominal wound. He was found to have cystitis growing klebsiella pneumonia and enterococcus faecium. Treated with ertapenem and daptomycin. On 8/18/23 he underwent cystoscopy with fulguration right ureteral orifice.     HPI obtained from patient visit, review of nursing home record, discussion with facility staff, and Epic review.     HPI:    Patient is currently upset, he says that he is leaking urine around the catheter. This has been happening for a number of days, but the urologist did not seem to care. He says he does not understand much of what the doctor's say in the hospital. During previous stay, [facility] 18/30. He believes that he just had surgery yesterday, he seems to be referring to the cystoscopy. Provider advises that the catheter needs to stay in place and would not recommend that we change it here. He agrees, but still feels that the doctor needs to be notified and needs  to do something about the leaking. It is difficult to get him to talk about much else.     CODE STATUS/ADVANCE DIRECTIVES DISCUSSION: Full Code   Patient's living condition: lives with spouse  ALLERGIES:   Allergies   Allergen Reactions    Amiodarone Other (See Comments)     Bradycardia and possible lung toxicity (work up pending)      PAST MEDICAL HISTORY: No past medical history on file.   PAST SURGICAL HISTORY:  has no past surgical history on file.  FAMILY HISTORY: family history is not on file.  SOCIAL HISTORY:      Post Discharge Medication Reconciliation Status:  MED REC REQUIRED  Post Medication Reconciliation Status:  Discharge medications reconciled, continue medications without change    Current Outpatient Medications   Medication Sig    acetaminophen (TYLENOL) 325 MG tablet Take 650 mg by mouth every 4 hours as needed    albuterol (PROVENTIL) (2.5 MG/3ML) 0.083% neb solution Take 2.5 mg by nebulization every 4 hours as needed    atorvastatin (LIPITOR) 20 MG tablet Take 1 tablet by mouth daily    DAPTOMYCIN IV Inject 400 mg into the vein every 24 hours    ertapenem (INVANZ) 1 GM vial Inject 1 g into the vein every 24 hours    ferrous sulfate (FEROSUL) 325 (65 Fe) MG tablet Take 325 mg by mouth daily    furosemide (LASIX) 40 MG tablet Take 1 tablet by mouth every morning    HYDROmorphone (DILAUDID) 2 MG tablet Take 2 mg by mouth every 4 hours as needed for pain    hydrOXYzine (VISTARIL) 25 MG capsule Take 25 mg by mouth 4 times daily as needed for anxiety    levothyroxine (SYNTHROID/LEVOTHROID) 88 MCG tablet Take 88 mcg by mouth daily    metoprolol succinate ER (TOPROL XL) 50 MG 24 hr tablet Take 50 mg by mouth daily    Multiple Vitamin (ONE-A-DAY ESSENTIAL) TABS Take 1 tablet by mouth daily    omeprazole (PRILOSEC) 20 MG DR capsule Take 20 mg by mouth daily    traZODone (DESYREL) 100 MG tablet Take 1 tablet by mouth At Bedtime     No current facility-administered medications for this visit.     ROS:  4  "point ROS including Respiratory, CV, GI and , other than that noted in the HPI,  is negative    Vitals:  /62   Pulse 60   Temp 97.7  F (36.5  C)   Resp 17   Ht 1.753 m (5' 9\")   Wt 56.8 kg (125 lb 4.8 oz)   SpO2 98%   BMI 18.50 kg/m    Exam:  GENERAL APPEARANCE:  Alert, in no distress  ENT:  Mouth and posterior oropharynx normal, moist mucous membranes  EYES:  EOM normal, conjunctiva and lids normal  RESP:  no respiratory distress  CV:  no edema  :    Whitney catheter with clear, yellow urine  PSYCH:  insight and judgement impaired, memory impaired     Lab/Diagnostic Data:  Recent labs in [place] reviewed by me today.       ASSESSMENT/PLAN:  (R53.81) Physical deconditioning  (primary encounter diagnosis)  Comment: Due to surgery, complications, two prolonged hospital stays  Plan: PT/OT eval and treat, discharge planning per their recommendations.    (N30.01) Acute cystitis with hematuria  Comment: Urology notes do not mention leaking, but on 8/20, they noted that the whitney should stay in for 1 week. 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 He likely will need support at home as well. It is not clear yet how that will be accomplished. Much is dependent on his ability to learn the cares.   Plan: Continue current POC with no changes at this time and adjustments as needed.    (I47.1) Atrial tachycardia (H)  Comment: HR controlled. No AC at this time per colorectal surgery  Plan: Continue current POC with no changes at this time and adjustments as needed.    (I10) Benign essential hypertension  Comment: Chronic, controlled  Plan: Continue current POC with no changes at this time and adjustments as needed.    (I50.32) Chronic heart failure with preserved ejection fraction (H)  Comment: Chronic: CHF due to effects of HTN/Heart Disease. Currently: compensated.  Plan: Monitor for shortness " of breath, wheezing, increasing lower extremity edema and change in activity tolerance.     (N18.31) Stage 3a chronic kidney disease (H)  Comment: Chronic Kidney Disease due to: Hypertension.  Plan: Avoid nephrotoxic medications and adjust medications per renal function.         Electronically signed by: CODY Constantino CNP

## 2023-08-23 NOTE — TELEPHONE ENCOUNTER
[facility] Geriatrics Lab Note     Provider: CODY Oakley CNP   Facility: [facility] Facility Type:  TCU    Allergies   Allergen Reactions    Amiodarone Other (See Comments)     Bradycardia and possible lung toxicity (work up pending)       Labs Reviewed by provider: CBC, CMP and CK     Verbal Order/Direction given by Provider: NNO    Provider giving Order:  CODY Oakley CNP     Verbal Order given to: Mert Randall RN

## 2023-08-23 NOTE — LETTER
8/23/2023        RE: Aron Walker  [address] [place] [address]        [facility] GERIATRICS  Primary Care Provider & Clinic: Parag Mann MD, [address] [address] / [place] [address]  Chief Complaint   Patient presents with     Hospital F/U     [place] 8/14/2023 - 8/22/2023     [place] Medical Record Number: [id number]  Place of Service Where Encounter Took Place: [facility] (TCU) [[id number]]    Aron aWlker is a 68 year old (1955), returned to the above facility from  [facility]. Hospital stay 8/14/23 - 8/22/23. Patient was admitted to this TCU originally on 8/5/23 from [facility]. 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 Post-op course complicated by respiratory failure, fluid overload, abdominal wall wound dehiscence requiring takeback to OR, and a left ureteral stump urine leak being managed by Mcknight catheter.   He was sent back to the ED due to purulent discharge from abdominal wound. He was found to have cystitis growing klebsiella pneumonia and enterococcus faecium. Treated with ertapenem and daptomycin. On 8/18/23 he underwent cystoscopy with fulguration right ureteral orifice.     HPI obtained from patient visit, review of nursing home record, discussion with facility staff, and Epic review.     HPI:    Patient is currently upset, he says that he is leaking urine around the catheter. This has been happening for a number of days, but the urologist did not seem to care. He says he does not understand much of what the doctor's say in the hospital. During previous stay, [facility] 18/30. He believes that he just had surgery yesterday, he seems to be referring to the cystoscopy. Provider advises that the catheter needs to stay in place and would not recommend that  we change it here. He agrees, but still feels that the doctor needs to be notified and needs to do something about the leaking. It is difficult to get him to talk about much else.     CODE STATUS/ADVANCE DIRECTIVES DISCUSSION: Full Code   Patient's living condition: lives with spouse  ALLERGIES:   Allergies   Allergen Reactions     Amiodarone Other (See Comments)     Bradycardia and possible lung toxicity (work up pending)      PAST MEDICAL HISTORY: No past medical history on file.   PAST SURGICAL HISTORY:  has no past surgical history on file.  FAMILY HISTORY: family history is not on file.  SOCIAL HISTORY:      Post Discharge Medication Reconciliation Status:  MED REC REQUIRED  Post Medication Reconciliation Status:  Discharge medications reconciled, continue medications without change    Current Outpatient Medications   Medication Sig     acetaminophen (TYLENOL) 325 MG tablet Take 650 mg by mouth every 4 hours as needed     albuterol (PROVENTIL) (2.5 MG/3ML) 0.083% neb solution Take 2.5 mg by nebulization every 4 hours as needed     atorvastatin (LIPITOR) 20 MG tablet Take 1 tablet by mouth daily     DAPTOMYCIN IV Inject 400 mg into the vein every 24 hours     ertapenem (INVANZ) 1 GM vial Inject 1 g into the vein every 24 hours     ferrous sulfate (FEROSUL) 325 (65 Fe) MG tablet Take 325 mg by mouth daily     furosemide (LASIX) 40 MG tablet Take 1 tablet by mouth every morning     HYDROmorphone (DILAUDID) 2 MG tablet Take 2 mg by mouth every 4 hours as needed for pain     hydrOXYzine (VISTARIL) 25 MG capsule Take 25 mg by mouth 4 times daily as needed for anxiety     levothyroxine (SYNTHROID/LEVOTHROID) 88 MCG tablet Take 88 mcg by mouth daily     metoprolol succinate ER (TOPROL XL) 50 MG 24 hr tablet Take 50 mg by mouth daily     Multiple Vitamin (ONE-A-DAY ESSENTIAL) TABS Take 1 tablet by mouth daily     omeprazole (PRILOSEC) 20 MG DR capsule Take 20 mg by mouth daily     traZODone (DESYREL) 100 MG tablet Take  "1 tablet by mouth At Bedtime     No current facility-administered medications for this visit.     ROS:  4 point ROS including Respiratory, CV, GI and , other than that noted in the HPI,  is negative    Vitals:  /62   Pulse 60   Temp 97.7  F (36.5  C)   Resp 17   Ht 1.753 m (5' 9\")   Wt 56.8 kg (125 lb 4.8 oz)   SpO2 98%   BMI 18.50 kg/m    Exam:  GENERAL APPEARANCE:  Alert, in no distress  ENT:  Mouth and posterior oropharynx normal, moist mucous membranes  EYES:  EOM normal, conjunctiva and lids normal  RESP:  no respiratory distress  CV:  no edema  :    Whitney catheter with clear, yellow urine  PSYCH:  insight and judgement impaired, memory impaired     Lab/Diagnostic Data:  Recent labs in [place] reviewed by me today.       ASSESSMENT/PLAN:  (R53.81) Physical deconditioning  (primary encounter diagnosis)  Comment: Due to surgery, complications, two prolonged hospital stays  Plan: PT/OT eval and treat, discharge planning per their recommendations.    (N30.01) Acute cystitis with hematuria  Comment: Urology notes do not mention leaking, but on 8/20, they noted that the whitney should stay in for 1 week. 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 He likely will need support at home as well. It is not clear yet how that will be accomplished. Much is dependent on his ability to learn the cares.   Plan: Continue current POC with no changes at this time and adjustments as needed.    (I47.1) Atrial tachycardia (H)  Comment: HR controlled. No AC at this time per colorectal surgery  Plan: Continue current POC with no changes at this time and adjustments as needed.    (I10) Benign essential hypertension  Comment: Chronic, controlled  Plan: Continue current POC with no changes at this time and adjustments as needed.    (I50.32) Chronic heart failure with preserved ejection fraction " (H)  Comment: Chronic: CHF due to effects of HTN/Heart Disease. Currently: compensated.  Plan: Monitor for shortness of breath, wheezing, increasing lower extremity edema and change in activity tolerance.     (N18.31) Stage 3a chronic kidney disease (H)  Comment: Chronic Kidney Disease due to: Hypertension.  Plan: Avoid nephrotoxic medications and adjust medications per renal function.         Electronically signed by: CODY Constantino CNP      Sincerely,        CODY Constantino CNP

## 2023-08-25 DIAGNOSIS — N32.1 VESICOCOLIC FISTULA: Primary | ICD-10-CM

## 2023-08-25 RX ORDER — HYDROMORPHONE HYDROCHLORIDE 2 MG/1
2 TABLET ORAL EVERY 4 HOURS PRN
Qty: 40 TABLET | Refills: 0 | Status: SHIPPED | OUTPATIENT
Start: 2023-08-25 | End: 2023-08-28

## 2023-08-28 ENCOUNTER — TRANSITIONAL CARE UNIT VISIT (OUTPATIENT)
Dept: GERIATRICS | Facility: CLINIC | Age: 68
End: 2023-08-28
Payer: COMMERCIAL

## 2023-08-28 VITALS
BODY MASS INDEX: 18.59 KG/M2 | HEART RATE: 111 BPM | SYSTOLIC BLOOD PRESSURE: 94 MMHG | TEMPERATURE: 97.6 F | DIASTOLIC BLOOD PRESSURE: 64 MMHG | HEIGHT: 69 IN | OXYGEN SATURATION: 95 % | WEIGHT: 125.5 LBS | RESPIRATION RATE: 16 BRPM

## 2023-08-28 DIAGNOSIS — R39.0: ICD-10-CM

## 2023-08-28 DIAGNOSIS — R53.81 PHYSICAL DECONDITIONING: Primary | ICD-10-CM

## 2023-08-28 DIAGNOSIS — N32.1 VESICOCOLIC FISTULA: ICD-10-CM

## 2023-08-28 DIAGNOSIS — I50.32 CHRONIC HEART FAILURE WITH PRESERVED EJECTION FRACTION (H): ICD-10-CM

## 2023-08-28 DIAGNOSIS — I47.19 ATRIAL TACHYCARDIA (H): ICD-10-CM

## 2023-08-28 PROBLEM — I10 BENIGN ESSENTIAL HYPERTENSION: Status: RESOLVED | Noted: 2023-08-08 | Resolved: 2023-08-28

## 2023-08-28 PROCEDURE — 99309 SBSQ NF CARE MODERATE MDM 30: CPT | Performed by: NURSE PRACTITIONER

## 2023-08-28 RX ORDER — HYDROMORPHONE HYDROCHLORIDE 2 MG/1
2 TABLET ORAL EVERY 4 HOURS PRN
Qty: 40 TABLET | Refills: 0 | Status: SHIPPED | OUTPATIENT
Start: 2023-08-28 | End: 2023-09-01 | Stop reason: SINTOL

## 2023-08-28 NOTE — LETTER
8/28/2023        RE: Aron Walker  [address] [place] [address]        [facility] GERIATRICS    Chief Complaint   Patient presents with     RECHECK     HPI:  Aron Walker is a 68 year old  (1955), who is being seen today for an episodic care visit at: [facility] (U) [[id number]]. [facility]. Hospital stay 8/14/23 - 8/22/23. Patient was admitted to this TCU originally on 8/5/23 from [facility]. 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 Post-op course complicated by respiratory failure, fluid overload, abdominal wall wound dehiscence requiring takeback to OR, and a left ureteral stump urine leak being managed by Whitney catheter.   He was sent back to the ED due to purulent discharge from abdominal wound. He was found to have cystitis growing klebsiella pneumonia and enterococcus faecium. Treated with ertapenem and daptomycin. On 8/18/23 he underwent cystoscopy with fulguration right ureteral orifice.     Today's concern is:   Patient has been very frustrated with his whitney catheter due to continuous leaking from urethra. Oxybutynin was started 8/25/23. He continues to leak. Per chart review, urology did note that a voiding trial could be done at TCU as early as 8/25/23. He is very happy to hear this and eager to have the catheter out. He is fine trying here instead of waiting for urology appointment. His daughter is on speaker phone. She is concerned about him still having a bladder infection as this has been a recurrent issue for him. She is advised that the catheter is being removed prior to the antibiotics being completed, which should help ensure resolution. We can certainly check a specimen down the road, would recommend waiting a week. He has a care conference tomorrow. He is  "eager to get home, but is willing to stay as long as PT/OT suggest.     Allergies, and PMH/PSH reviewed in [facility] today.  REVIEW OF SYSTEMS:  10 point ROS of systems including Constitutional, Eyes, Respiratory, Cardiovascular, Gastroenterology, Genitourinary, Integumentary, Musculoskeletal, Psychiatric were all negative except for pertinent positives noted in my HPI.    Objective:   BP 94/64   Pulse 111   Temp 97.6  F (36.4  C)   Resp 16   Ht 1.753 m (5' 9\")   Wt 56.9 kg (125 lb 8 oz)   SpO2 95%   BMI 18.53 kg/m    GENERAL APPEARANCE:  Alert, in no distress  ENT:  Mouth and posterior oropharynx normal, moist mucous membranes  EYES:  EOM normal, conjunctiva and lids normal  RESP:  no respiratory distress  CV:  no edema  :    Whitney bag with clear, yellow urine, with rare small pieces of sediment  PSYCH:  oriented X 3, insight and judgement impaired, memory impaired , affect and mood normal    Recent labs in [facility] reviewed by me today.       Assessment/Plan:  (R53.81) Physical deconditioning  (primary encounter diagnosis)  Comment: Improving  Plan: PT/OT eval and treat, discharge planning per their recommendations.    (R39.0) Ureteral leak, left  Comment: Given that he is leaking large amounts of urine around the catheter, he likely will not have retention issues. Advised him that he may certainly still have incontinence, could have dysuria from trauma. Will stop oxybutynin for now to reduce risk of retention. If he has large volume incontinence or spasms, could resume this  Plan: Discontinue oxybutynin. Remove whitney catheter. PVR bladderscan qshift, straight cath if >400mL    (I47.1) Atrial tachycardia (H)  Comment: HR elevated today, but otherwise has been controlled. No AC at this time per colorectal surgery  Plan: Continue current POC with no changes at this time and adjustments as needed.    (I50.32) Chronic heart failure with preserved ejection fraction (H)  Comment: Chronic: CHF due to effects of HTN/Heart " Disease. Currently: compensated.  Plan: Continue current medications: lasix. Monitor for shortness of breath, wheezing, increasing lower extremity edema and change in activity tolerance.       Orders:  Discontinue oxybutynin  Remove whitney catheter. PVR bladderscan qshift, straight cath if >400mL      Electronically signed by: CODY Constantino CNP        Sincerely,        CODY Constantino CNP

## 2023-08-28 NOTE — PROGRESS NOTES
[facility] GERIATRICS    Chief Complaint   Patient presents with    RECHECK     HPI:  Aron Walker is a 68 year old  (1955), who is being seen today for an episodic care visit at: [facility] (U) [[id number]]. [facility]. Hospital stay 8/14/23 - 8/22/23. Patient was admitted to this TCU originally on 8/5/23 from [facility]. 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 Post-op course complicated by respiratory failure, fluid overload, abdominal wall wound dehiscence requiring takeback to OR, and a left ureteral stump urine leak being managed by Whitney catheter.   He was sent back to the ED due to purulent discharge from abdominal wound. He was found to have cystitis growing klebsiella pneumonia and enterococcus faecium. Treated with ertapenem and daptomycin. On 8/18/23 he underwent cystoscopy with fulguration right ureteral orifice.     Today's concern is:   Patient has been very frustrated with his whitney catheter due to continuous leaking from urethra. Oxybutynin was started 8/25/23. He continues to leak. Per chart review, urology did note that a voiding trial could be done at [facility] as early as 8/25/23. He is very happy to hear this and eager to have the catheter out. He is fine trying here instead of waiting for urology appointment. His daughter is on speaker phone. She is concerned about him still having a bladder infection as this has been a recurrent issue for him. She is advised that the catheter is being removed prior to the antibiotics being completed, which should help ensure resolution. We can certainly check a specimen down the road, would recommend waiting a week. He has a care conference tomorrow. He is eager to get home, but is willing to stay as long as PT/OT suggest.     Allergies, and  "PMH/PSH reviewed in [facility] today.  REVIEW OF SYSTEMS:  10 point ROS of systems including Constitutional, Eyes, Respiratory, Cardiovascular, Gastroenterology, Genitourinary, Integumentary, Musculoskeletal, Psychiatric were all negative except for pertinent positives noted in my HPI.    Objective:   BP 94/64   Pulse 111   Temp 97.6  F (36.4  C)   Resp 16   Ht 1.753 m (5' 9\")   Wt 56.9 kg (125 lb 8 oz)   SpO2 95%   BMI 18.53 kg/m    GENERAL APPEARANCE:  Alert, in no distress  ENT:  Mouth and posterior oropharynx normal, moist mucous membranes  EYES:  EOM normal, conjunctiva and lids normal  RESP:  no respiratory distress  CV:  no edema  :    Whitney bag with clear, yellow urine, with rare small pieces of sediment  PSYCH:  oriented X 3, insight and judgement impaired, memory impaired , affect and mood normal    Recent labs in [facility] reviewed by me today.       Assessment/Plan:  (R53.81) Physical deconditioning  (primary encounter diagnosis)  Comment: Improving  Plan: PT/OT eval and treat, discharge planning per their recommendations.    (R39.0) Ureteral leak, left  Comment: Given that he is leaking large amounts of urine around the catheter, he likely will not have retention issues. Advised him that he may certainly still have incontinence, could have dysuria from trauma. Will stop oxybutynin for now to reduce risk of retention. If he has large volume incontinence or spasms, could resume this  Plan: Discontinue oxybutynin. Remove whitney catheter. PVR bladderscan qshift, straight cath if >400mL    (I47.1) Atrial tachycardia (H)  Comment: HR elevated today, but otherwise has been controlled. No AC at this time per colorectal surgery  Plan: Continue current POC with no changes at this time and adjustments as needed.    (I50.32) Chronic heart failure with preserved ejection fraction (H)  Comment: Chronic: CHF due to effects of HTN/Heart Disease. Currently: compensated.  Plan: Continue current medications: lasix. Monitor " for shortness of breath, wheezing, increasing lower extremity edema and change in activity tolerance.       Orders:  Discontinue oxybutynin  Remove whitney catheter. PVR bladderscan qshift, straight cath if >400mL      Electronically signed by: CODY Constantino CNP

## 2023-08-29 ENCOUNTER — TELEPHONE (OUTPATIENT)
Dept: GERIATRICS | Facility: CLINIC | Age: 68
End: 2023-08-29
Payer: COMMERCIAL

## 2023-08-29 NOTE — TELEPHONE ENCOUNTER
[facility] Geriatrics Triage Nurse Telephone Encounter    Provider: CODY Oakley CNP   Facility: [facility] Facility Type:  TCU    Caller: [place]  Call Back Number: [phone]    Allergies:    Allergies   Allergen Reactions    Amiodarone Other (See Comments)     Bradycardia and possible lung toxicity (work up pending)        Reason for call: Nursing is calling to report that the patient is having hallucinations and talking to people in the room that are not there. Nursing also reports that the patients O2 sats have been ranging from 69-97% on 3lpm. There was some SOB this am, LS shallow with Neb given this am.     Vitals: BP:  100/62  P:: 75  R:: 18  SPO2: 69-97% O2 at 3 L/min Temp.:  98.7.       Verbal Order/Direction given by Provider: Send to ER for evaluation.     Provider giving Order:  CODY Oakley CNP     Verbal Order given to: Mert Ortiz RN

## 2023-08-30 ENCOUNTER — TRANSITIONAL CARE UNIT VISIT (OUTPATIENT)
Dept: GERIATRICS | Facility: CLINIC | Age: 68
End: 2023-08-30
Payer: COMMERCIAL

## 2023-08-30 VITALS
RESPIRATION RATE: 18 BRPM | TEMPERATURE: 97.6 F | DIASTOLIC BLOOD PRESSURE: 65 MMHG | WEIGHT: 125.5 LBS | HEART RATE: 61 BPM | BODY MASS INDEX: 18.59 KG/M2 | SYSTOLIC BLOOD PRESSURE: 106 MMHG | HEIGHT: 69 IN | OXYGEN SATURATION: 94 %

## 2023-08-30 DIAGNOSIS — I50.32 CHRONIC HEART FAILURE WITH PRESERVED EJECTION FRACTION (H): ICD-10-CM

## 2023-08-30 DIAGNOSIS — I47.19 ATRIAL TACHYCARDIA (H): ICD-10-CM

## 2023-08-30 DIAGNOSIS — R39.0: ICD-10-CM

## 2023-08-30 DIAGNOSIS — R41.0 DELIRIUM: Primary | ICD-10-CM

## 2023-08-30 DIAGNOSIS — D72.829 LEUKOCYTOSIS, UNSPECIFIED TYPE: ICD-10-CM

## 2023-08-30 DIAGNOSIS — R53.81 PHYSICAL DECONDITIONING: ICD-10-CM

## 2023-08-30 PROCEDURE — 99310 SBSQ NF CARE HIGH MDM 45: CPT | Performed by: NURSE PRACTITIONER

## 2023-08-30 NOTE — LETTER
"    8/30/2023        RE: Aron Walker  [address] [place] [address]        [facility]    Chief Complaint   Patient presents with     RECHECK     HPI:  Aron Walker is a 68 year old  (1955), who is being seen today for an episodic care visit at: [facility] (U) [[id number]]. [facility]. Hospital stay 8/14/23 - 8/22/23. Patient was admitted to this TCU originally on 8/5/23 from [facility]. 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 Post-op course complicated by respiratory failure, fluid overload, abdominal wall wound dehiscence requiring takeback to OR, and a left ureteral stump urine leak being managed by Whitney catheter.   He was sent back to the ED due to purulent discharge from abdominal wound. He was found to have cystitis growing klebsiella pneumonia and enterococcus faecium. Treated with ertapenem and daptomycin. On 8/18/23 he underwent cystoscopy with fulguration right ureteral orifice.     Today's concern is:   Patient was sent to the ED yesterday due to acute confusion with hallucinations and hypoxia. They felt that his symptoms were due to hydromorphone and placed this on hold. He had lab work and UA collected and was discharged back to TCU. Today he is much more clear. He says he can even remember being confused. He is in agreement with not taking any more opioids. He is excited to have the whitney catheter out. He has been incontinent and able to void in the urinal at times. No dysuria, hematuria.     Allergies, and PMH/PSH reviewed in EPIC today.  REVIEW OF SYSTEMS:  4 point ROS including Respiratory, CV, GI and , other than that noted in the HPI,  is negative    Objective:   /65   Pulse 61   Temp 97.6  F (36.4  C)   Resp 18   Ht 1.753 m (5' 9\")   Wt " 56.9 kg (125 lb 8 oz)   SpO2 94%   BMI 18.53 kg/m    GENERAL APPEARANCE:  Alert, in no distress  ENT:  Mouth and posterior oropharynx normal, moist mucous membranes  EYES:  EOM normal, conjunctiva and lids normal  RESP:  no respiratory distress  CV:  no edema  PSYCH:  oriented X 3, insight and judgement impaired, affect and mood normal    Recent labs in [facility] reviewed by me today.     Assessment/Plan:  (R41.0) Delirium  (primary encounter diagnosis)  (D72.829) Leukocytosis, unspecified type  Comment: This does appear to be resolved today. Patient has some underlying cognitive impairment, therefore is at risk for delirium with acute illness, polypharmacy. It may have simply been the hydromorphone, but will continue to watch for other symptoms. His WBC were slightly elevated in the ED, will recheck this as well.  Plan: Discontinue hydromorphone. CBC, BMP 9/1/23    (R39.0) Ureteral leak, left  Comment: Patient is emptying his bladder well. Incontinence is to be expected.   Plan: Monitor for results of urine culture at ED. Can also recheck next week due to history of recurrent infections    (R53.81) Physical deconditioning  Comment: PT and OT will re-evaluate today given his dramatic change yesterday    (I47.1) Atrial tachycardia (H)  Comment: HR controlled. No AC at this time per colorectal surgery  Plan: Continue current POC with no changes at this time and adjustments as needed.    (I50.32) Chronic heart failure with preserved ejection fraction (H)  Comment: Chronic: CHF due to effects of HTN/Heart Disease. Currently: compensated.  Plan: Continue current medications: lasix. Monitor for shortness of breath, wheezing, increasing lower extremity edema and change in activity tolerance.       Orders:  CBC, BMP 9/1/23  Discontinue hydromorphone      Electronically signed by: Jenny Vega, CODY CNP           Sincerely,        CODY Constantino CNP

## 2023-08-30 NOTE — PROGRESS NOTES
"[facility]IATR[facility]    Chief Complaint   Patient presents with    RECHECK     HPI:  Aron Walker is a 68 year old  (1955), who is being seen today for an episodic care visit at: [facility] (TCU) [[id number]]. [facility]. Hospital stay 8/14/23 - 8/22/23. Patient was admitted to this TCU originally on 8/5/23 from [facility]. 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 Post-op course complicated by respiratory failure, fluid overload, abdominal wall wound dehiscence requiring takeback to OR, and a left ureteral stump urine leak being managed by Whitney catheter.   He was sent back to the ED due to purulent discharge from abdominal wound. He was found to have cystitis growing klebsiella pneumonia and enterococcus faecium. Treated with ertapenem and daptomycin. On 8/18/23 he underwent cystoscopy with fulguration right ureteral orifice.     Today's concern is:   Patient was sent to the ED yesterday due to acute confusion with hallucinations and hypoxia. They felt that his symptoms were due to hydromorphone and placed this on hold. He had lab work and UA collected and was discharged back to TCU. Today he is much more clear. He says he can even remember being confused. He is in agreement with not taking any more opioids. He is excited to have the whitney catheter out. He has been incontinent and able to void in the urinal at times. No dysuria, hematuria.     Allergies, and PMH/PSH reviewed in EPIC today.  REVIEW OF SYSTEMS:  4 point ROS including Respiratory, CV, GI and , other than that noted in the HPI,  is negative    Objective:   /65   Pulse 61   Temp 97.6  F (36.4  C)   Resp 18   Ht 1.753 m (5' 9\")   Wt 56.9 kg (125 lb 8 oz)   SpO2 94%   BMI 18.53 kg/m    GENERAL APPEARANCE:  Alert, in no " distress  ENT:  Mouth and posterior oropharynx normal, moist mucous membranes  EYES:  EOM normal, conjunctiva and lids normal  RESP:  no respiratory distress  CV:  no edema  PSYCH:  oriented X 3, insight and judgement impaired, affect and mood normal    Recent labs in [facility] reviewed by me today.     Assessment/Plan:  (R41.0) Delirium  (primary encounter diagnosis)  (D72.829) Leukocytosis, unspecified type  Comment: This does appear to be resolved today. Patient has some underlying cognitive impairment, therefore is at risk for delirium with acute illness, polypharmacy. It may have simply been the hydromorphone, but will continue to watch for other symptoms. His WBC were slightly elevated in the ED, will recheck this as well.  Plan: Discontinue hydromorphone. CBC, BMP 9/1/23    (R39.0) Ureteral leak, left  Comment: Patient is emptying his bladder well. Incontinence is to be expected.   Plan: Monitor for results of urine culture at ED. Can also recheck next week due to history of recurrent infections    (R53.81) Physical deconditioning  Comment: PT and OT will re-evaluate today given his dramatic change yesterday    (I47.1) Atrial tachycardia (H)  Comment: HR controlled. No AC at this time per colorectal surgery  Plan: Continue current POC with no changes at this time and adjustments as needed.    (I50.32) Chronic heart failure with preserved ejection fraction (H)  Comment: Chronic: CHF due to effects of HTN/Heart Disease. Currently: compensated.  Plan: Continue current medications: lasix. Monitor for shortness of breath, wheezing, increasing lower extremity edema and change in activity tolerance.       Orders:  CBC, BMP 9/1/23  Discontinue hydromorphone      Electronically signed by: CODY Constantino CNP

## 2023-08-31 ENCOUNTER — LAB REQUISITION (OUTPATIENT)
Dept: LAB | Facility: CLINIC | Age: 68
End: 2023-08-31
Payer: COMMERCIAL

## 2023-08-31 DIAGNOSIS — N18.30 CHRONIC KIDNEY DISEASE, STAGE 3 UNSPECIFIED (H): ICD-10-CM

## 2023-08-31 DIAGNOSIS — I10 ESSENTIAL (PRIMARY) HYPERTENSION: ICD-10-CM

## 2023-09-01 ENCOUNTER — TELEPHONE (OUTPATIENT)
Dept: GERIATRICS | Facility: CLINIC | Age: 68
End: 2023-09-01

## 2023-09-01 LAB
ANION GAP SERPL CALCULATED.3IONS-SCNC: 10 MMOL/L (ref 7–15)
BUN SERPL-MCNC: 21.3 MG/DL (ref 8–23)
CALCIUM SERPL-MCNC: 8.8 MG/DL (ref 8.8–10.2)
CHLORIDE SERPL-SCNC: 101 MMOL/L (ref 98–107)
CREAT SERPL-MCNC: 1.31 MG/DL (ref 0.67–1.17)
DEPRECATED HCO3 PLAS-SCNC: 28 MMOL/L (ref 22–29)
ERYTHROCYTE [DISTWIDTH] IN BLOOD BY AUTOMATED COUNT: 18.6 % (ref 10–15)
GFR SERPL CREATININE-BSD FRML MDRD: 59 ML/MIN/1.73M2
GLUCOSE SERPL-MCNC: 108 MG/DL (ref 70–99)
HCT VFR BLD AUTO: 28.6 % (ref 40–53)
HGB BLD-MCNC: 8.5 G/DL (ref 13.3–17.7)
MCH RBC QN AUTO: 28.1 PG (ref 26.5–33)
MCHC RBC AUTO-ENTMCNC: 29.7 G/DL (ref 31.5–36.5)
MCV RBC AUTO: 95 FL (ref 78–100)
PLATELET # BLD AUTO: 409 10E3/UL (ref 150–450)
POTASSIUM SERPL-SCNC: 3.8 MMOL/L (ref 3.4–5.3)
RBC # BLD AUTO: 3.02 10E6/UL (ref 4.4–5.9)
SODIUM SERPL-SCNC: 139 MMOL/L (ref 136–145)
WBC # BLD AUTO: 8.3 10E3/UL (ref 4–11)

## 2023-09-01 PROCEDURE — 36415 COLL VENOUS BLD VENIPUNCTURE: CPT | Mod: ORL | Performed by: NURSE PRACTITIONER

## 2023-09-01 PROCEDURE — 80048 BASIC METABOLIC PNL TOTAL CA: CPT | Mod: ORL | Performed by: NURSE PRACTITIONER

## 2023-09-01 PROCEDURE — 85027 COMPLETE CBC AUTOMATED: CPT | Mod: ORL | Performed by: NURSE PRACTITIONER

## 2023-09-01 PROCEDURE — P9603 ONE-WAY ALLOW PRORATED MILES: HCPCS | Mod: ORL | Performed by: NURSE PRACTITIONER

## 2023-09-01 RX ORDER — FUROSEMIDE 20 MG
20 TABLET ORAL DAILY
Start: 2023-09-01

## 2023-09-01 NOTE — CONFIDENTIAL NOTE
Unable to see patient today, he has gone out for a . Labs back show improved WBC, but worsening creatinine. He has not shown evidence of any any hypervolemia throughout TCU stay. Will reduce lasix.    Decrease lasix to 20mg daily  BMP 23

## 2023-09-05 ENCOUNTER — LAB REQUISITION (OUTPATIENT)
Dept: LAB | Facility: CLINIC | Age: 68
End: 2023-09-05
Payer: COMMERCIAL

## 2023-09-05 DIAGNOSIS — I50.31 ACUTE DIASTOLIC (CONGESTIVE) HEART FAILURE (H): ICD-10-CM

## 2023-09-06 ENCOUNTER — TRANSITIONAL CARE UNIT VISIT (OUTPATIENT)
Dept: GERIATRICS | Facility: CLINIC | Age: 68
End: 2023-09-06
Payer: COMMERCIAL

## 2023-09-06 VITALS
BODY MASS INDEX: 20.11 KG/M2 | HEART RATE: 103 BPM | SYSTOLIC BLOOD PRESSURE: 92 MMHG | DIASTOLIC BLOOD PRESSURE: 58 MMHG | OXYGEN SATURATION: 95 % | WEIGHT: 135.8 LBS | TEMPERATURE: 98 F | RESPIRATION RATE: 18 BRPM | HEIGHT: 69 IN

## 2023-09-06 DIAGNOSIS — N18.31 STAGE 3A CHRONIC KIDNEY DISEASE (H): ICD-10-CM

## 2023-09-06 DIAGNOSIS — R53.81 PHYSICAL DECONDITIONING: Primary | ICD-10-CM

## 2023-09-06 DIAGNOSIS — I50.32 CHRONIC HEART FAILURE WITH PRESERVED EJECTION FRACTION (H): ICD-10-CM

## 2023-09-06 DIAGNOSIS — I47.19 ATRIAL TACHYCARDIA (H): ICD-10-CM

## 2023-09-06 DIAGNOSIS — R41.89 COGNITIVE IMPAIRMENT: ICD-10-CM

## 2023-09-06 LAB
ANION GAP SERPL CALCULATED.3IONS-SCNC: 12 MMOL/L (ref 7–15)
BUN SERPL-MCNC: 18.7 MG/DL (ref 8–23)
CALCIUM SERPL-MCNC: 8.6 MG/DL (ref 8.8–10.2)
CHLORIDE SERPL-SCNC: 102 MMOL/L (ref 98–107)
CREAT SERPL-MCNC: 1.24 MG/DL (ref 0.67–1.17)
DEPRECATED HCO3 PLAS-SCNC: 25 MMOL/L (ref 22–29)
EGFRCR SERPLBLD CKD-EPI 2021: 63 ML/MIN/1.73M2
GLUCOSE SERPL-MCNC: 62 MG/DL (ref 70–99)
POTASSIUM SERPL-SCNC: 4.1 MMOL/L (ref 3.4–5.3)
SODIUM SERPL-SCNC: 139 MMOL/L (ref 136–145)

## 2023-09-06 PROCEDURE — 99309 SBSQ NF CARE MODERATE MDM 30: CPT | Performed by: NURSE PRACTITIONER

## 2023-09-06 PROCEDURE — 36415 COLL VENOUS BLD VENIPUNCTURE: CPT | Mod: ORL | Performed by: INTERNAL MEDICINE

## 2023-09-06 PROCEDURE — 80048 BASIC METABOLIC PNL TOTAL CA: CPT | Mod: ORL | Performed by: INTERNAL MEDICINE

## 2023-09-06 PROCEDURE — P9603 ONE-WAY ALLOW PRORATED MILES: HCPCS | Mod: ORL | Performed by: INTERNAL MEDICINE

## 2023-09-06 NOTE — LETTER
9/6/2023        RE: Aron Dominguez  [address] [place] [address]        [facility]AT[facility]    Chief Complaint   Patient presents with     RECHECK     HPI:  Aron Dominguez is a 68 year old  (1955), who is being seen today for an episodic care visit at: [facility] (TCU) [[id number]]. [facility]. Hospital stay 8/14/23 - 8/22/23. Patient was admitted to this TCU originally on 8/5/23 from [facility]. 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 Post-op course complicated by respiratory failure, fluid overload, abdominal wall wound dehiscence requiring takeback to OR, and a left ureteral stump urine leak being managed by Mcknight catheter.   He was sent back to the ED due to purulent discharge from abdominal wound. He was found to have cystitis growing klebsiella pneumonia and enterococcus faecium. Treated with ertapenem and daptomycin. On 8/18/23 he underwent cystoscopy with fulguration right ureteral orifice.     Today's concern is:   Patient was recently seen in the ED due to delirium. This was thought to be related to hydromorphone and he has been improved off of this. SLUMS 17/30. Lasix was reduced on 9/1/23 due to rising creatinine. His weight is up quite a bit, but this seems inaccurate. Some weights are in the wheelchair, some are standing. He denies any edema or SOB. No hypoxia. His only concern is that he is feeling eager to get home. He has not tried stairs yet.   Wt Readings from Last 4 Encounters:   09/06/23 61.6 kg (135 lb 12.8 oz)   08/30/23 56.9 kg (125 lb 8 oz)   08/28/23 56.9 kg (125 lb 8 oz)   08/23/23 56.8 kg (125 lb 4.8 oz)       Allergies, and PMH/PSH reviewed in EPIC today.  REVIEW OF SYSTEMS:  4 point ROS including Respiratory, CV, GI and , other than that noted in the  "HPI,  is negative    Objective:   BP 92/58   Pulse 103   Temp 98  F (36.7  C)   Resp 18   Ht 1.753 m (5' 9\")   Wt 61.6 kg (135 lb 12.8 oz)   SpO2 95%   BMI 20.05 kg/m    GENERAL APPEARANCE:  Alert, in no distress  ENT:  Mouth and posterior oropharynx normal, moist mucous membranes  EYES:  EOM normal, conjunctiva and lids normal  RESP:  lungs clear to auscultation , no respiratory distress  CV:  peripheral edema trace+ in ankles  PSYCH:  oriented X 3, insight and judgement impaired, memory impaired     Recent labs in EPIC reviewed by me today.     Assessment/Plan:  (R53.81) Physical deconditioning  (primary encounter diagnosis)  Comment: Improving  Plan: PT/OT eval and treat, discharge planning per their recommendations.    (I50.32) Chronic heart failure with preserved ejection fraction (H)  Comment: Despite documented weight gain, no s/sx hypervolemia on exam.   Plan: Continue reduced dose of lasix. Monitor weights q day. Monitor for shortness of breath, wheezing, increasing lower extremity edema and change in activity tolerance.     (N18.31) Stage 3a chronic kidney disease (H)  Comment: KELLEE slightly improved today  Plan: BMP next week    (R41.89) Cognitive impairment  Comment: Patient will need support at home for his medications and colostomy. At discharge, will order home nursing, but family will need to assist as well    (I47.1) Atrial tachycardia (H)  Comment: HR controlled. No AC at this time per colorectal surgery  Plan: Continue current POC with no changes at this time and adjustments as needed.      Electronically signed by: CODY Constantino CNP           Sincerely,        CODY Constantino CNP      "

## 2023-09-06 NOTE — PROGRESS NOTES
[facility]ERIATRICS    Chief Complaint   Patient presents with    RECHECK     HPI:  Aron Dominguez is a 68 year old  (1955), who is being seen today for an episodic care visit at: [facility] (TCU) [[id number]]. [facility]. Hospital stay 8/14/23 - 8/22/23. Patient was admitted to this TCU originally on 8/5/23 from [facility]. 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 Post-op course complicated by respiratory failure, fluid overload, abdominal wall wound dehiscence requiring takeback to OR, and a left ureteral stump urine leak being managed by Mcknight catheter.   He was sent back to the ED due to purulent discharge from abdominal wound. He was found to have cystitis growing klebsiella pneumonia and enterococcus faecium. Treated with ertapenem and daptomycin. On 8/18/23 he underwent cystoscopy with fulguration right ureteral orifice.     Today's concern is:   Patient was recently seen in the ED due to delirium. This was thought to be related to hydromorphone and he has been improved off of this. SLUMS 17/30. Lasix was reduced on 9/1/23 due to rising creatinine. His weight is up quite a bit, but this seems inaccurate. Some weights are in the wheelchair, some are standing. He denies any edema or SOB. No hypoxia. His only concern is that he is feeling eager to get home. He has not tried stairs yet.   Wt Readings from Last 4 Encounters:   09/06/23 61.6 kg (135 lb 12.8 oz)   08/30/23 56.9 kg (125 lb 8 oz)   08/28/23 56.9 kg (125 lb 8 oz)   08/23/23 56.8 kg (125 lb 4.8 oz)       Allergies, and PMH/PSH reviewed in EPIC today.  REVIEW OF SYSTEMS:  4 point ROS including Respiratory, CV, GI and , other than that noted in the HPI,  is negative    Objective:   BP 92/58   Pulse 103   Temp 98  F (36.7  C)   Resp 18   " Ht 1.753 m (5' 9\")   Wt 61.6 kg (135 lb 12.8 oz)   SpO2 95%   BMI 20.05 kg/m    GENERAL APPEARANCE:  Alert, in no distress  ENT:  Mouth and posterior oropharynx normal, moist mucous membranes  EYES:  EOM normal, conjunctiva and lids normal  RESP:  lungs clear to auscultation , no respiratory distress  CV:  peripheral edema trace+ in ankles  PSYCH:  oriented X 3, insight and judgement impaired, memory impaired     Recent labs in EPIC reviewed by me today.     Assessment/Plan:  (R53.81) Physical deconditioning  (primary encounter diagnosis)  Comment: Improving  Plan: PT/OT eval and treat, discharge planning per their recommendations.    (I50.32) Chronic heart failure with preserved ejection fraction (H)  Comment: Despite documented weight gain, no s/sx hypervolemia on exam.   Plan: Continue reduced dose of lasix. Monitor weights q day. Monitor for shortness of breath, wheezing, increasing lower extremity edema and change in activity tolerance.     (N18.31) Stage 3a chronic kidney disease (H)  Comment: KELLEE slightly improved today  Plan: BMP next week    (R41.89) Cognitive impairment  Comment: Patient will need support at home for his medications and colostomy. At discharge, will order home nursing, but family will need to assist as well    (I47.1) Atrial tachycardia (H)  Comment: HR controlled. No AC at this time per colorectal surgery  Plan: Continue current POC with no changes at this time and adjustments as needed.      Electronically signed by: CODY Constantino CNP       "

## 2023-09-08 ENCOUNTER — TRANSITIONAL CARE UNIT VISIT (OUTPATIENT)
Dept: GERIATRICS | Facility: CLINIC | Age: 68
End: 2023-09-08
Payer: COMMERCIAL

## 2023-09-08 VITALS
HEIGHT: 69 IN | DIASTOLIC BLOOD PRESSURE: 51 MMHG | BODY MASS INDEX: 19.7 KG/M2 | HEART RATE: 64 BPM | WEIGHT: 133 LBS | RESPIRATION RATE: 18 BRPM | TEMPERATURE: 97.7 F | OXYGEN SATURATION: 96 % | SYSTOLIC BLOOD PRESSURE: 83 MMHG

## 2023-09-08 DIAGNOSIS — N18.31 STAGE 3A CHRONIC KIDNEY DISEASE (H): ICD-10-CM

## 2023-09-08 DIAGNOSIS — I50.32 CHRONIC HEART FAILURE WITH PRESERVED EJECTION FRACTION (H): Primary | ICD-10-CM

## 2023-09-08 PROCEDURE — 99308 SBSQ NF CARE LOW MDM 20: CPT | Performed by: NURSE PRACTITIONER

## 2023-09-08 NOTE — LETTER
9/8/2023        RE: Aron Dominguez  [address] [place] [address]        [facility] GERIATRICS    Chief Complaint   Patient presents with     RECHECK     HPI:  Aron Dominguez is a 68 year old  (1955), who is being seen today for an episodic care visit at: [facility] (U) [[id number]]. [facility]. Hospital stay 8/14/23 - 8/22/23. Patient was admitted to this TCU originally on 8/5/23 from [facility]. 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 Post-op course complicated by respiratory failure, fluid overload, abdominal wall wound dehiscence requiring takeback to OR, and a left ureteral stump urine leak being managed by Mcknight catheter.   He was sent back to the ED due to purulent discharge from abdominal wound. He was found to have cystitis growing klebsiella pneumonia and enterococcus faecium. Treated with ertapenem and daptomycin. On 8/18/23 he underwent cystoscopy with fulguration right ureteral orifice.     Today's concern is:   Lasix was reduced a couple of weeks ago. Provider noted some weight gain. He says he has been drinking a lot of water. Provider has seen him going to the dining room to fill up this bottle regularly. He denies any SOB or edema. No hypoxia. Provider advises him that he should just drink when he is thirsty and not push water too much due to his CHF and risk for hypervolemia  Wt Readings from Last 4 Encounters:   09/08/23 60.3 kg (133 lb)   09/06/23 61.6 kg (135 lb 12.8 oz)   08/30/23 56.9 kg (125 lb 8 oz)   08/28/23 56.9 kg (125 lb 8 oz)       Allergies, and PMH/PSH reviewed in EPIC today.  REVIEW OF SYSTEMS:  4 point ROS including Respiratory, CV, GI and , other than that noted in the HPI,  is negative    Objective:   BP (!) 83/51   Pulse 64   Temp 97.7  F  "(36.5  C)   Resp 18   Ht 1.753 m (5' 9\")   Wt 60.3 kg (133 lb)   SpO2 96%   BMI 19.64 kg/m    GENERAL APPEARANCE:  Alert, in no distress  RESP:  lungs clear to auscultation , no respiratory distress  CV:  no edema  PSYCH:  oriented X 3, affect and mood normal    Recent labs in [facility] reviewed by me today.     Assessment/Plan:  (I50.32) Chronic heart failure with preserved ejection fraction (H)  (primary encounter diagnosis)  (N18.31) Stage 3a chronic kidney disease (H)  Comment: Weight has stabilized now. No s/sx hypervolemia.  Plan: Continue current medications: lasix 20mg. BMP next week. Monitor weights q day. Call provider if greater than 3 pound gain from previous weight. Monitor for shortness of breath, wheezing, increasing lower extremity edema and change in activity tolerance.       Electronically signed by: CODY Constantino CNP           Sincerely,        CODY Constantino CNP      "

## 2023-09-10 NOTE — PROGRESS NOTES
"[facility] GERIATRICS    Chief Complaint   Patient presents with    RECHECK     HPI:  Aron Dominguez is a 68 year old  (1955), who is being seen today for an episodic care visit at: [facility] (TCU) [[id number]]. [facility]. Hospital stay 8/14/23 - 8/22/23. Patient was admitted to this TCU originally on 8/5/23 from [facility]. 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 Post-op course complicated by respiratory failure, fluid overload, abdominal wall wound dehiscence requiring takeback to OR, and a left ureteral stump urine leak being managed by Mcknight catheter.   He was sent back to the ED due to purulent discharge from abdominal wound. He was found to have cystitis growing klebsiella pneumonia and enterococcus faecium. Treated with ertapenem and daptomycin. On 8/18/23 he underwent cystoscopy with fulguration right ureteral orifice.     Today's concern is:   Lasix was reduced a couple of weeks ago. Provider noted some weight gain. He says he has been drinking a lot of water. Provider has seen him going to the dining room to fill up this bottle regularly. He denies any SOB or edema. No hypoxia. Provider advises him that he should just drink when he is thirsty and not push water too much due to his CHF and risk for hypervolemia  Wt Readings from Last 4 Encounters:   09/08/23 60.3 kg (133 lb)   09/06/23 61.6 kg (135 lb 12.8 oz)   08/30/23 56.9 kg (125 lb 8 oz)   08/28/23 56.9 kg (125 lb 8 oz)       Allergies, and PMH/PSH reviewed in EPIC today.  REVIEW OF SYSTEMS:  4 point ROS including Respiratory, CV, GI and , other than that noted in the HPI,  is negative    Objective:   BP (!) 83/51   Pulse 64   Temp 97.7  F (36.5  C)   Resp 18   Ht 1.753 m (5' 9\")   Wt 60.3 kg (133 lb)   SpO2 96%   BMI 19.64 " kg/m    GENERAL APPEARANCE:  Alert, in no distress  RESP:  lungs clear to auscultation , no respiratory distress  CV:  no edema  PSYCH:  oriented X 3, affect and mood normal    Recent labs in [facility] reviewed by me today.     Assessment/Plan:  (I50.32) Chronic heart failure with preserved ejection fraction (H)  (primary encounter diagnosis)  (N18.31) Stage 3a chronic kidney disease (H)  Comment: Weight has stabilized now. No s/sx hypervolemia.  Plan: Continue current medications: lasix 20mg. BMP next week. Monitor weights q day. Call provider if greater than 3 pound gain from previous weight. Monitor for shortness of breath, wheezing, increasing lower extremity edema and change in activity tolerance.       Electronically signed by: CODY Constantino CNP

## 2023-09-13 ENCOUNTER — TRANSITIONAL CARE UNIT VISIT (OUTPATIENT)
Dept: GERIATRICS | Facility: CLINIC | Age: 68
End: 2023-09-13
Payer: COMMERCIAL

## 2023-09-13 VITALS
SYSTOLIC BLOOD PRESSURE: 97 MMHG | BODY MASS INDEX: 20.71 KG/M2 | HEIGHT: 69 IN | WEIGHT: 139.8 LBS | RESPIRATION RATE: 18 BRPM | HEART RATE: 72 BPM | DIASTOLIC BLOOD PRESSURE: 60 MMHG | OXYGEN SATURATION: 92 % | TEMPERATURE: 97.5 F

## 2023-09-13 DIAGNOSIS — R41.89 COGNITIVE IMPAIRMENT: ICD-10-CM

## 2023-09-13 DIAGNOSIS — T81.31XS POSTOPERATIVE WOUND DEHISCENCE, SEQUELA: ICD-10-CM

## 2023-09-13 DIAGNOSIS — I50.32 CHRONIC HEART FAILURE WITH PRESERVED EJECTION FRACTION (H): ICD-10-CM

## 2023-09-13 DIAGNOSIS — N18.31 STAGE 3A CHRONIC KIDNEY DISEASE (H): ICD-10-CM

## 2023-09-13 DIAGNOSIS — R53.81 PHYSICAL DECONDITIONING: Primary | ICD-10-CM

## 2023-09-13 PROCEDURE — 99309 SBSQ NF CARE MODERATE MDM 30: CPT | Performed by: NURSE PRACTITIONER

## 2023-09-13 NOTE — LETTER
9/13/2023        RE: Aron Dominguez  [address] [place] [address]        [facility] GERIATRICS    Chief Complaint   Patient presents with     RECHECK     HPI:  Aron Dominguez is a 68 year old  (1955), who is being seen today for an episodic care visit at: [facility] (TCU) [[id number]]. [facility]. Hospital stay 8/14/23 - 8/22/23. Patient was admitted to this TCU originally on 8/5/23 from [facility]. 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 Post-op course complicated by respiratory failure, fluid overload, abdominal wall wound dehiscence requiring takeback to OR, and a left ureteral stump urine leak being managed by Mcknight catheter.   He was sent back to the ED due to purulent discharge from abdominal wound. He was found to have cystitis growing klebsiella pneumonia and enterococcus faecium. Treated with ertapenem and daptomycin. On 8/18/23 he underwent cystoscopy with fulguration right ureteral orifice.     Today's concern is:   Patient says that he is feeling good and would like to discharge home Friday or Saturday. Provider advises him that they will have to discuss with the care team. He is voiding well. He says that he is learning how to manage his ostomy. His lasix was decreased due to hypotension and KELLEE. His weight has been climbing, but he denies edema, SOB. No hypoxia. He is eating and drinking well.   BP 90-100s/50-60s    In discussion with the team, he still needs assistance with mobility and ADLs, will need caregiver training for discharge. He also has wet-to-dry dressing changes to his abdomen that will require teaching. He also continues to have poor hygiene practices with the ostomy, will get stool on his fingers when trying to empty it.     Allergies, and PMH/PSH  "reviewed in [facility] today.  REVIEW OF SYSTEMS:  4 point ROS including Respiratory, CV, GI and , other than that noted in the HPI,  is negative    Objective:   BP 97/60   Pulse 72   Temp 97.5  F (36.4  C)   Resp 18   Ht 1.753 m (5' 9\")   Wt 63.4 kg (139 lb 12.8 oz)   SpO2 92%   BMI 20.64 kg/m    GENERAL APPEARANCE:  Alert, in no distress  ENT:  Mouth and posterior oropharynx normal, moist mucous membranes  EYES:  EOM normal, conjunctiva and lids normal  RESP:  no respiratory distress  CV:  no edema  PSYCH:  oriented X 3, insight and judgement impaired, memory impaired , affect and mood normal    Recent labs in [facility] reviewed by me today.     Assessment/Plan:  (R53.81) Physical deconditioning  (primary encounter diagnosis)  Comment: Improving. Will defer to therapy and his family regarding discharge plan  Plan: PT/OT eval and treat, discharge planning per their recommendations.    (I50.32) Chronic heart failure with preserved ejection fraction (H)  Comment: Despite documented weight gain, he still does not show any evidence of hypervolemia on exam  Plan: Continue current medications: lasix 20mg. BMP next week. Monitor weights q day. Call provider if greater than 3 pound gain from previous weight. Monitor for shortness of breath, wheezing, increasing lower extremity edema and change in activity tolerance.     (N18.31) Stage 3a chronic kidney disease (H)  Comment: Improved with decrease in lasix  Plan: Avoid nephrotoxic medications and adjust medications per renal function. Monitor renal function prn    (R41.89) Cognitive impairment  Comment: Patient will need assistance at home with dressing changes, ostomy, IADLs    (T81.31XS) Postoperative wound dehiscence, sequela  Comment: Wound is healing, but family will need training on dressing changes prior to discharge      Electronically signed by: Jenny Vega, CODY CNP           Sincerely,        Jenny Vega, CODY CNP      "

## 2023-09-13 NOTE — PROGRESS NOTES
[facility] GERIATRICS    Chief Complaint   Patient presents with    RECHECK     HPI:  Aron Dominguez is a 68 year old  (1955), who is being seen today for an episodic care visit at: [facility] (TCU) [[id number]]. [facility]. Hospital stay 8/14/23 - 8/22/23. Patient was admitted to this TCU originally on 8/5/23 from [facility]. 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 Post-op course complicated by respiratory failure, fluid overload, abdominal wall wound dehiscence requiring takeback to OR, and a left ureteral stump urine leak being managed by Mcknight catheter.   He was sent back to the ED due to purulent discharge from abdominal wound. He was found to have cystitis growing klebsiella pneumonia and enterococcus faecium. Treated with ertapenem and daptomycin. On 8/18/23 he underwent cystoscopy with fulguration right ureteral orifice.     Today's concern is:   Patient says that he is feeling good and would like to discharge home Friday or Saturday. Provider advises him that they will have to discuss with the care team. He is voiding well. He says that he is learning how to manage his ostomy. His lasix was decreased due to hypotension and KELLEE. His weight has been climbing, but he denies edema, SOB. No hypoxia. He is eating and drinking well.   BP 90-100s/50-60s    In discussion with the team, he still needs assistance with mobility and ADLs, will need caregiver training for discharge. He also has wet-to-dry dressing changes to his abdomen that will require teaching. He also continues to have poor hygiene practices with the ostomy, will get stool on his fingers when trying to empty it.     Allergies, and PMH/PSH reviewed in [facility] today.  REVIEW OF SYSTEMS:  4 point ROS including Respiratory, CV, GI and , other  "than that noted in the HPI,  is negative    Objective:   BP 97/60   Pulse 72   Temp 97.5  F (36.4  C)   Resp 18   Ht 1.753 m (5' 9\")   Wt 63.4 kg (139 lb 12.8 oz)   SpO2 92%   BMI 20.64 kg/m    GENERAL APPEARANCE:  Alert, in no distress  ENT:  Mouth and posterior oropharynx normal, moist mucous membranes  EYES:  EOM normal, conjunctiva and lids normal  RESP:  no respiratory distress  CV:  no edema  PSYCH:  oriented X 3, insight and judgement impaired, memory impaired , affect and mood normal    Recent labs in [facility] reviewed by me today.     Assessment/Plan:  (R53.81) Physical deconditioning  (primary encounter diagnosis)  Comment: Improving. Will defer to therapy and his family regarding discharge plan  Plan: PT/OT eval and treat, discharge planning per their recommendations.    (I50.32) Chronic heart failure with preserved ejection fraction (H)  Comment: Despite documented weight gain, he still does not show any evidence of hypervolemia on exam  Plan: Continue current medications: lasix 20mg. BMP next week. Monitor weights q day. Call provider if greater than 3 pound gain from previous weight. Monitor for shortness of breath, wheezing, increasing lower extremity edema and change in activity tolerance.     (N18.31) Stage 3a chronic kidney disease (H)  Comment: Improved with decrease in lasix  Plan: Avoid nephrotoxic medications and adjust medications per renal function. Monitor renal function prn    (R41.89) Cognitive impairment  Comment: Patient will need assistance at home with dressing changes, ostomy, IADLs    (T81.31XS) Postoperative wound dehiscence, sequela  Comment: Wound is healing, but family will need training on dressing changes prior to discharge      Electronically signed by: Jenny Vega, CODY CNP       "

## 2023-09-15 ENCOUNTER — DISCHARGE SUMMARY NURSING HOME (OUTPATIENT)
Dept: GERIATRICS | Facility: CLINIC | Age: 68
End: 2023-09-15
Payer: COMMERCIAL

## 2023-09-15 VITALS
TEMPERATURE: 99.1 F | HEART RATE: 63 BPM | HEIGHT: 69 IN | SYSTOLIC BLOOD PRESSURE: 149 MMHG | BODY MASS INDEX: 20.83 KG/M2 | DIASTOLIC BLOOD PRESSURE: 76 MMHG | OXYGEN SATURATION: 98 % | WEIGHT: 140.6 LBS | RESPIRATION RATE: 18 BRPM

## 2023-09-15 DIAGNOSIS — N18.31 STAGE 3A CHRONIC KIDNEY DISEASE (H): ICD-10-CM

## 2023-09-15 DIAGNOSIS — I50.32 CHRONIC HEART FAILURE WITH PRESERVED EJECTION FRACTION (H): ICD-10-CM

## 2023-09-15 DIAGNOSIS — N32.1 VESICOCOLIC FISTULA: ICD-10-CM

## 2023-09-15 DIAGNOSIS — R53.81 PHYSICAL DECONDITIONING: Primary | ICD-10-CM

## 2023-09-15 DIAGNOSIS — F10.21 ALCOHOL DEPENDENCE IN REMISSION (H): ICD-10-CM

## 2023-09-15 DIAGNOSIS — I47.19 ATRIAL TACHYCARDIA (H): ICD-10-CM

## 2023-09-15 DIAGNOSIS — R39.0: ICD-10-CM

## 2023-09-15 PROCEDURE — 99316 NF DSCHRG MGMT 30 MIN+: CPT | Performed by: NURSE PRACTITIONER

## 2023-09-15 NOTE — LETTER
9/15/2023        RE: Aron Dominguez  [address] [place] [address]        [facility] GERIATRICS DISCHARGE SUMMARY  PATIENT'S NAME: Aron Dominguez  YOB: 1955  MEDICAL RECORD NUMBER:  [id number]  Place of Service where encounter took place:  [facility] (U) [[id number]]    PRIMARY CARE PROVIDER AND CLINIC RESPONSIBLE AFTER TRANSFER:   Parga Mann MD, [address] / [place] [address]    [facility] Provider     Transferring providers: Jenny ROSS CNP; Tatyana Swanson MD    Recent Hospitalization/ED:  Hospital  [facility] stay 8/29/23 to 8/29/23.  Date of SNF Admission: August 29, 2023  Date of SNF (anticipated) Discharge: Patient driven September 16, 2023  Discharged to: previous independent home  Cognitive Scores: SLUMS: 17/30    CODE STATUS/ADVANCE DIRECTIVES DISCUSSION:  Full Code   ALLERGIES: Amiodarone    NURSING FACILITY COURSE   Medication Changes/Rationale:   Lasix decreased due to hypovolemia, hypotension, KELLEE  Hydromorphone stopped due to delirium    Summary of nursing facility stay:   Aron Walker is a 68 year old  (1955), who is being seen today for a discharge visit at: [facility] (TCU) [[id number]]. [facility] stay 8/14/23 - 8/22/23. Patient was admitted to this TCU originally on 8/5/23 from [facility]. 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 Post-op course complicated by respiratory failure, fluid overload, abdominal wall wound dehiscence requiring takeback to OR, and a left ureteral stump urine leak being managed by Mcknight catheter.   He was sent back to the ED due to purulent discharge from abdominal wound. He was found to have cystitis growing klebsiella pneumonia and enterococcus faecium. Treated with ertapenem and  daptomycin. On 8/18/23 he underwent cystoscopy with fulguration right ureteral orifice.     Patient is insisting on discharging home 9/16/23 despite therapy and nursing recommending he stay another week. His family has agreed to come in for caregiver training and take home per his request.    Physical deconditioning  Improving, but still needs assistance with mobility and ADLs. Family are coming for training with therapy later today. He will have home PT/OT    Vesicocolic fistula  Patient is not reliable at managing his colostomy. His hygiene practices are not appropriate, often gets stool on his fingers. Family will also need training with nursing staff for this. He also still has a wet-to-dry dressing change on his abdominal wound that has not yet healed.     Chronic heart failure with preserved ejection fraction (H)  Lasix was reduced over 2 weeks ago. He has had some documented weight gain, but on exam he has no edema, lungs are clear. No respiratory symptoms. He has been eating very well. His BP does remain low 90-100s/50s    Atrial tachycardia (H)  HR controlled. Off anticoagulation for now, to be resumed per surgeon    Stage 3a chronic kidney disease (H)  See labs    Ureteral leak, left  Patient is voiding well without a catheter now    Alcohol dependence in remission (H)  Patient reports that now that he has not had a drink in over 3 months, he hopes to continue abstaining      Discharge Medications:    Current Outpatient Medications   Medication Sig Dispense Refill     acetaminophen (TYLENOL) 325 MG tablet Take 650 mg by mouth every 4 hours as needed       albuterol (PROVENTIL) (2.5 MG/3ML) 0.083% neb solution Take 2.5 mg by nebulization every 4 hours as needed       atorvastatin (LIPITOR) 20 MG tablet Take 1 tablet by mouth daily       ferrous sulfate (FEROSUL) 325 (65 Fe) MG tablet Take 325 mg by mouth daily       furosemide (LASIX) 20 MG tablet Take 1 tablet (20 mg) by mouth daily       levothyroxine  "(SYNTHROID/LEVOTHROID) 88 MCG tablet Take 88 mcg by mouth daily       metoprolol succinate ER (TOPROL XL) 50 MG 24 hr tablet Take 50 mg by mouth daily       Multiple Vitamin (ONE-A-DAY ESSENTIAL) TABS Take 1 tablet by mouth daily       omeprazole (PRILOSEC) 20 MG DR capsule Take 20 mg by mouth daily       traZODone (DESYREL) 100 MG tablet Take 1 tablet by mouth At Bedtime          Controlled medications:   not applicable/none     Past Medical History: No past medical history on file.  Physical Exam:   Vitals: BP (!) 149/76   Pulse 63   Temp 99.1  F (37.3  C)   Resp 18   Ht 1.753 m (5' 9\")   Wt 63.8 kg (140 lb 9.6 oz)   SpO2 98%   BMI 20.76 kg/m    BMI: Body mass index is 20.76 kg/m .  GENERAL APPEARANCE:  Alert, in no distress  ENT:  Mouth and posterior oropharynx normal, moist mucous membranes  EYES:  EOM normal, conjunctiva and lids normal  RESP:  lungs clear to auscultation , no respiratory distress  CV:  no edema  ABDOMEN:  ostomy round, pink, stool brown, soft, non-tender  PSYCH:  insight and judgement impaired, memory impaired , affect and mood normal     SNF labs: Labs done in SNF are in [employer]. Please refer to them using [employer] Everywhere.    DISCHARGE PLAN:  Follow up labs: No labs orders/due  Medical Follow Up:      Follow up with primary care provider in 2-3 weeks  Discharge Services: Home Care:  Occupational Therapy, Physical Therapy, Registered Nurse, and Home Health Aide      TOTAL DISCHARGE TIME:   Greater than 30 minutes  Electronically signed by:  CODY Constantino CNP       Documentation of Face to Face and Certification for Home Health Services    I certify that services are/were furnished while this patient was under the care of a physician and that a physician or an allowed non-physician practitioner (NPP), had a face-to-face encounter that meets the physician face-to-face encounter requirements. The encounter was in whole, or in part, related to the primary reason for home " health. The patient is confined to his/her home and needs intermittent skilled nursing, physical therapy, speech-language pathology, or the continued need for occupational therapy. A plan of care has been established by a physician and is periodically reviewed by a physician.  Date of Face-to-Face Encounter: 9/15/2023.    I certify that, based on my findings, the following services are medically necessary home health services: Nursing, Occupational Therapy, and Physical Therapy.    My clinical findings support the need for the above skilled services because: Requires assistance of another person or specialized equipment to access medical services because patient: Requires supervision of another for safe transfer...    Patient to re-establish plan of care with their PCP within 7-10 days after leaving the facility to reestablish care.  Medicare certified [employer] provider: CODY Constantino CNP  Date: September 15, 2023               Sincerely,        CODY Constantino CNP

## 2023-09-15 NOTE — PROGRESS NOTES
[facility] GERIATRICS DISCHARGE SUMMARY  PATIENT'S NAME: Aron Dominguez  YOB: 1955  MEDICAL RECORD NUMBER:  [id number]  Place of Service where encounter took place:  [facility] (U) [[id number]]    PRIMARY CARE PROVIDER AND CLINIC RESPONSIBLE AFTER TRANSFER:   Parag Mann MD, [address] / [place] [address]    [facility] Provider     Transferring providers: Jenny ROSS CNP; Tatyana Swanson MD    Recent Hospitalization/ED:  Hospital  [facility] stay 8/29/23 to 8/29/23.  Date of SNF Admission: August 29, 2023  Date of SNF (anticipated) Discharge: Patient driven September 16, 2023  Discharged to: previous independent home  Cognitive Scores: SLUMS: 17/30    CODE STATUS/ADVANCE DIRECTIVES DISCUSSION:  Full Code   ALLERGIES: Amiodarone    NURSING FACILITY COURSE   Medication Changes/Rationale:   Lasix decreased due to hypovolemia, hypotension, KELLEE  Hydromorphone stopped due to delirium    Summary of nursing facility stay:   Aron Walker is a 68 year old  (1955), who is being seen today for a discharge visit at: [facility] (TCU) [[id number]]. [facility] stay 8/14/23 - 8/22/23. Patient was admitted to this TCU originally on 8/5/23 from [facility]. 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 Post-op course complicated by respiratory failure, fluid overload, abdominal wall wound dehiscence requiring takeback to OR, and a left ureteral stump urine leak being managed by Mcknight catheter.   He was sent back to the ED due to purulent discharge from abdominal wound. He was found to have cystitis growing klebsiella pneumonia and enterococcus faecium. Treated with ertapenem and daptomycin. On 8/18/23 he underwent cystoscopy with fulguration right ureteral orifice.      Patient is insisting on discharging home 9/16/23 despite therapy and nursing recommending he stay another week. His family has agreed to come in for caregiver training and take home per his request.    Physical deconditioning  Improving, but still needs assistance with mobility and ADLs. Family are coming for training with therapy later today. He will have home PT/OT    Vesicocolic fistula  Patient is not reliable at managing his colostomy. His hygiene practices are not appropriate, often gets stool on his fingers. Family will also need training with nursing staff for this. He also still has a wet-to-dry dressing change on his abdominal wound that has not yet healed.     Chronic heart failure with preserved ejection fraction (H)  Lasix was reduced over 2 weeks ago. He has had some documented weight gain, but on exam he has no edema, lungs are clear. No respiratory symptoms. He has been eating very well. His BP does remain low 90-100s/50s    Atrial tachycardia (H)  HR controlled. Off anticoagulation for now, to be resumed per surgeon    Stage 3a chronic kidney disease (H)  See labs    Ureteral leak, left  Patient is voiding well without a catheter now    Alcohol dependence in remission (H)  Patient reports that now that he has not had a drink in over 3 months, he hopes to continue abstaining      Discharge Medications:    Current Outpatient Medications   Medication Sig Dispense Refill    acetaminophen (TYLENOL) 325 MG tablet Take 650 mg by mouth every 4 hours as needed      albuterol (PROVENTIL) (2.5 MG/3ML) 0.083% neb solution Take 2.5 mg by nebulization every 4 hours as needed      atorvastatin (LIPITOR) 20 MG tablet Take 1 tablet by mouth daily      ferrous sulfate (FEROSUL) 325 (65 Fe) MG tablet Take 325 mg by mouth daily      furosemide (LASIX) 20 MG tablet Take 1 tablet (20 mg) by mouth daily      levothyroxine (SYNTHROID/LEVOTHROID) 88 MCG tablet Take 88 mcg by mouth daily      metoprolol succinate ER  "(TOPROL XL) 50 MG 24 hr tablet Take 50 mg by mouth daily      Multiple Vitamin (ONE-A-DAY ESSENTIAL) TABS Take 1 tablet by mouth daily      omeprazole (PRILOSEC) 20 MG DR capsule Take 20 mg by mouth daily      traZODone (DESYREL) 100 MG tablet Take 1 tablet by mouth At Bedtime          Controlled medications:   not applicable/none     Past Medical History: No past medical history on file.  Physical Exam:   Vitals: BP (!) 149/76   Pulse 63   Temp 99.1  F (37.3  C)   Resp 18   Ht 1.753 m (5' 9\")   Wt 63.8 kg (140 lb 9.6 oz)   SpO2 98%   BMI 20.76 kg/m    BMI: Body mass index is 20.76 kg/m .  GENERAL APPEARANCE:  Alert, in no distress  ENT:  Mouth and posterior oropharynx normal, moist mucous membranes  EYES:  EOM normal, conjunctiva and lids normal  RESP:  lungs clear to auscultation , no respiratory distress  CV:  no edema  ABDOMEN:  ostomy round, pink, stool brown, soft, non-tender  PSYCH:  insight and judgement impaired, memory impaired , affect and mood normal     SNF labs: Labs done in SNF are in [employer] [facility]. Please refer to them using [employer] Everywhere.    DISCHARGE PLAN:  Follow up labs: No labs orders/due  Medical Follow Up:      Follow up with primary care provider in 2-3 weeks  Discharge Services: Home Care:  Occupational Therapy, Physical Therapy, Registered Nurse, and Home Health Aide      TOTAL DISCHARGE TIME:   Greater than 30 minutes  Electronically signed by:  CODY Constantino CNP       Documentation of Face to Face and Certification for Home Health Services    I certify that services are/were furnished while this patient was under the care of a physician and that a physician or an allowed non-physician practitioner (NPP), had a face-to-face encounter that meets the physician face-to-face encounter requirements. The encounter was in whole, or in part, related to the primary reason for home health. The patient is confined to his/her home and needs intermittent skilled nursing, physical " therapy, speech-language pathology, or the continued need for occupational therapy. A plan of care has been established by a physician and is periodically reviewed by a physician.  Date of Face-to-Face Encounter: 9/15/2023.    I certify that, based on my findings, the following services are medically necessary home health services: Nursing, Occupational Therapy, and Physical Therapy.    My clinical findings support the need for the above skilled services because: Requires assistance of another person or specialized equipment to access medical services because patient: Requires supervision of another for safe transfer...    Patient to re-establish plan of care with their PCP within 7-10 days after leaving the facility to reestablish care.  Medicare certified PECOS provider: CODY Constantino CNP  Date: September 15, 2023

## 2025-07-21 ENCOUNTER — TRANSFERRED RECORDS (OUTPATIENT)
Dept: HEALTH INFORMATION MANAGEMENT | Facility: CLINIC | Age: 70
End: 2025-07-21
Payer: MEDICARE

## 2025-08-19 ENCOUNTER — TRANSCRIBE ORDERS (OUTPATIENT)
Dept: OTHER | Age: 70
End: 2025-08-19

## 2025-08-19 ENCOUNTER — MEDICAL CORRESPONDENCE (OUTPATIENT)
Dept: HEALTH INFORMATION MANAGEMENT | Facility: CLINIC | Age: 70
End: 2025-08-19
Payer: MEDICARE

## 2025-08-19 ENCOUNTER — TELEPHONE (OUTPATIENT)
Dept: UROLOGY | Facility: CLINIC | Age: 70
End: 2025-08-19
Payer: MEDICARE

## 2025-08-19 ENCOUNTER — PRE VISIT (OUTPATIENT)
Dept: UROLOGY | Facility: CLINIC | Age: 70
End: 2025-08-19
Payer: MEDICARE

## 2025-08-19 DIAGNOSIS — N36.0 URETHRAL FISTULA: Primary | ICD-10-CM

## 2025-08-22 ENCOUNTER — PRE VISIT (OUTPATIENT)
Dept: UROLOGY | Facility: CLINIC | Age: 70
End: 2025-08-22

## 2025-08-22 PROCEDURE — 99000 SPECIMEN HANDLING OFFICE-LAB: CPT | Performed by: PATHOLOGY

## 2025-08-22 PROCEDURE — 87186 SC STD MICRODIL/AGAR DIL: CPT | Performed by: UROLOGY

## 2025-08-25 ENCOUNTER — TELEPHONE (OUTPATIENT)
Dept: UROLOGY | Facility: CLINIC | Age: 70
End: 2025-08-25
Payer: MEDICARE

## 2025-08-25 DIAGNOSIS — N39.0 RECURRENT UTI: Primary | ICD-10-CM

## 2025-08-25 RX ORDER — SULFAMETHOXAZOLE AND TRIMETHOPRIM 800; 160 MG/1; MG/1
1 TABLET ORAL 2 TIMES DAILY
Qty: 10 TABLET | Refills: 0 | Status: SHIPPED | OUTPATIENT
Start: 2025-08-25 | End: 2025-08-30

## 2025-08-26 ENCOUNTER — TELEPHONE (OUTPATIENT)
Dept: UROLOGY | Facility: CLINIC | Age: 70
End: 2025-08-26
Payer: MEDICARE